# Patient Record
Sex: FEMALE | Race: WHITE | NOT HISPANIC OR LATINO | Employment: STUDENT | URBAN - METROPOLITAN AREA
[De-identification: names, ages, dates, MRNs, and addresses within clinical notes are randomized per-mention and may not be internally consistent; named-entity substitution may affect disease eponyms.]

---
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
## 2025-01-05 ENCOUNTER — OFFICE VISIT (OUTPATIENT)
Dept: URGENT CARE | Facility: CLINIC | Age: 20
End: 2025-01-05
Payer: COMMERCIAL

## 2025-01-05 VITALS
OXYGEN SATURATION: 99 % | TEMPERATURE: 98 F | BODY MASS INDEX: 38.67 KG/M2 | RESPIRATION RATE: 16 BRPM | WEIGHT: 218.25 LBS | HEIGHT: 63 IN | SYSTOLIC BLOOD PRESSURE: 118 MMHG | DIASTOLIC BLOOD PRESSURE: 80 MMHG | HEART RATE: 113 BPM

## 2025-01-05 DIAGNOSIS — R09.82 ALLERGIC RHINITIS WITH POSTNASAL DRIP: ICD-10-CM

## 2025-01-05 DIAGNOSIS — J02.0 STREP PHARYNGITIS: Primary | ICD-10-CM

## 2025-01-05 DIAGNOSIS — J30.9 ALLERGIC RHINITIS WITH POSTNASAL DRIP: ICD-10-CM

## 2025-01-05 DIAGNOSIS — J02.9 SORE THROAT: ICD-10-CM

## 2025-01-05 LAB
CTP QC/QA: YES
MOLECULAR STREP A: POSITIVE

## 2025-01-05 PROCEDURE — 99214 OFFICE O/P EST MOD 30 MIN: CPT | Mod: S$GLB,,, | Performed by: FAMILY MEDICINE

## 2025-01-05 PROCEDURE — 87651 STREP A DNA AMP PROBE: CPT | Mod: QW,S$GLB,, | Performed by: FAMILY MEDICINE

## 2025-01-05 RX ORDER — DEXAMETHASONE 4 MG/1
8 TABLET ORAL DAILY
Qty: 6 TABLET | Refills: 0 | Status: SHIPPED | OUTPATIENT
Start: 2025-01-05 | End: 2025-01-08

## 2025-01-05 RX ORDER — AMOXICILLIN AND CLAVULANATE POTASSIUM 875; 125 MG/1; MG/1
1 TABLET, FILM COATED ORAL EVERY 12 HOURS
Qty: 14 TABLET | Refills: 0 | Status: SHIPPED | OUTPATIENT
Start: 2025-01-05 | End: 2025-01-12

## 2025-01-05 RX ORDER — AMOXICILLIN AND CLAVULANATE POTASSIUM 875; 125 MG/1; MG/1
1 TABLET, FILM COATED ORAL EVERY 12 HOURS
Qty: 14 TABLET | Refills: 0 | Status: SHIPPED | OUTPATIENT
Start: 2025-01-05 | End: 2025-01-05

## 2025-01-05 RX ORDER — FLUTICASONE PROPIONATE 50 MCG
2 SPRAY, SUSPENSION (ML) NASAL DAILY
Qty: 9.9 ML | Refills: 3 | Status: SHIPPED | OUTPATIENT
Start: 2025-01-05

## 2025-01-05 RX ORDER — FLUTICASONE PROPIONATE 50 MCG
2 SPRAY, SUSPENSION (ML) NASAL DAILY
Qty: 9.9 ML | Refills: 3 | Status: SHIPPED | OUTPATIENT
Start: 2025-01-05 | End: 2025-01-05

## 2025-01-05 RX ORDER — DEXAMETHASONE 4 MG/1
8 TABLET ORAL DAILY
Qty: 6 TABLET | Refills: 0 | Status: SHIPPED | OUTPATIENT
Start: 2025-01-05 | End: 2025-01-05

## 2025-01-05 NOTE — PROGRESS NOTES
"Subjective:      Patient ID: Navjot Cool is a 19 y.o. female.    Vitals:  height is 5' 3" (1.6 m) and weight is 99 kg (218 lb 4.1 oz). Her oral temperature is 98.2 °F (36.8 °C). Her blood pressure is 118/80 and her pulse is 113 (abnormal). Her respiration is 16 and oxygen saturation is 99%.     Chief Complaint: Sore Throat    Pt present with sore throat, headaches, congestion. Sx started 1 week ago and sore throat started 2 days ago. Tx include dyquil with no relief.     Sore Throat   This is a new problem. The current episode started in the past 7 days. The problem has been gradually worsening. There has been no fever. The pain is at a severity of 5/10. The pain is moderate. Associated symptoms include congestion, coughing, headaches and trouble swallowing. Pertinent negatives include no vomiting. She has tried nothing for the symptoms.     HENT:  Positive for congestion, sore throat and trouble swallowing.    Respiratory:  Positive for cough.    Gastrointestinal:  Negative for vomiting.   Neurological:  Positive for headaches.      Objective:     Physical Exam   Constitutional: She is oriented to person, place, and time. She appears well-developed. She is cooperative.  Non-toxic appearance. She does not appear ill. No distress.   HENT:   Head: Normocephalic and atraumatic.   Ears:   Right Ear: Hearing, tympanic membrane, external ear and ear canal normal.   Left Ear: Hearing, tympanic membrane, external ear and ear canal normal.   Nose: Nose normal. No mucosal edema, rhinorrhea or nasal deformity. No epistaxis. Right sinus exhibits no maxillary sinus tenderness and no frontal sinus tenderness. Left sinus exhibits no maxillary sinus tenderness and no frontal sinus tenderness.   Mouth/Throat: Uvula is midline, oropharynx is clear and moist and mucous membranes are normal. No trismus in the jaw. Normal dentition. No uvula swelling. No oropharyngeal exudate, posterior oropharyngeal edema or posterior " oropharyngeal erythema.   Eyes: Conjunctivae and lids are normal. No scleral icterus.   Neck: Trachea normal and phonation normal. Neck supple. No edema present. No erythema present. No neck rigidity present.   Cardiovascular: Normal rate, regular rhythm, normal heart sounds and normal pulses.   Pulmonary/Chest: Effort normal and breath sounds normal. No respiratory distress. She has no decreased breath sounds. She has no rhonchi.   Abdominal: Normal appearance.   Musculoskeletal: Normal range of motion.         General: No deformity. Normal range of motion.   Neurological: She is alert and oriented to person, place, and time. She exhibits normal muscle tone. Coordination normal.   Skin: Skin is warm, dry, intact, not diaphoretic and not pale.   Psychiatric: Her speech is normal and behavior is normal. Judgment and thought content normal.   Nursing note and vitals reviewed.    Results for orders placed or performed in visit on 01/05/25   POCT Strep A, Molecular    Collection Time: 01/05/25  3:02 PM   Result Value Ref Range    Molecular Strep A, POC Positive (A) Negative     Acceptable Yes        Assessment:     1. Strep pharyngitis    2. Allergic rhinitis with postnasal drip    3. Sore throat        Plan:       Strep pharyngitis  -     Discontinue: amoxicillin-clavulanate 875-125mg (AUGMENTIN) 875-125 mg per tablet; Take 1 tablet by mouth every 12 (twelve) hours. for 7 days  Dispense: 14 tablet; Refill: 0  -     amoxicillin-clavulanate 875-125mg (AUGMENTIN) 875-125 mg per tablet; Take 1 tablet by mouth every 12 (twelve) hours. for 7 days  Dispense: 14 tablet; Refill: 0    Allergic rhinitis with postnasal drip  -     Discontinue: dexAMETHasone (DECADRON) 4 MG Tab; Take 2 tablets (8 mg total) by mouth once daily. for 3 doses  Dispense: 6 tablet; Refill: 0  -     dexAMETHasone (DECADRON) 4 MG Tab; Take 2 tablets (8 mg total) by mouth once daily. for 3 doses  Dispense: 6 tablet; Refill: 0    Sore  throat  -     POCT Strep A, Molecular    Other orders  -     Discontinue: fluticasone propionate (FLONASE) 50 mcg/actuation nasal spray; 2 sprays (100 mcg total) by Each Nostril route Daily.  Dispense: 9.9 mL; Refill: 3  -     fluticasone propionate (FLONASE) 50 mcg/actuation nasal spray; 2 sprays (100 mcg total) by Each Nostril route Daily.  Dispense: 9.9 mL; Refill: 3      Thank you for choosing [facility]!     Our goal in the Urgent Care is to always provide outstanding medical care. You may receive a survey by mail or e-mail in the next week regarding your experience today. We would greatly appreciate you completing and returning the survey. Your feedback provides us with a way to recognize our staff who provide very good care, and it helps us learn how to improve when your experience was below our aspiration of excellence.       We appreciate you trusting us with your medical care. We hope you feel better soon. We will be happy to take care of you for all of your future medical needs.  You must understand that you've received an Urgent Care treatment only and that you may be released before all your medical problems are known or treated. You, the patient, will arrange for follow up care as instructed.  Follow up with your PCP or specialty clinic as directed in the next 1-2 weeks if not improved or as needed.  You can call [phone] to schedule an appointment with the appropriate provider.  Another option is to follow up with [facility] ([email or web address]) virtually for quick simple medical advice.  If your condition worsens we recommend that you receive another evaluation at the emergency room immediately or contact your primary medical clinics after hours call service to discuss your concerns.  Please return here or go to the Emergency Department for any concerns or worsening of condition.      *If you were prescribed a narcotic or controlled  medication, do not drive or operate heavy equipment or machinery while taking these medications.

## 2025-01-20 ENCOUNTER — TELEPHONE (OUTPATIENT)
Dept: OBSTETRICS AND GYNECOLOGY | Facility: CLINIC | Age: 20
End: 2025-01-20
Payer: COMMERCIAL

## 2025-01-20 NOTE — TELEPHONE ENCOUNTER
Called patient. No answer. Left voice message for patient to call the office.      Clinic closure due to weather. Needs to be rescheduled.

## 2025-01-26 ENCOUNTER — PATIENT MESSAGE (OUTPATIENT)
Dept: PRIMARY CARE CLINIC | Facility: CLINIC | Age: 20
End: 2025-01-26
Payer: COMMERCIAL

## 2025-01-27 ENCOUNTER — PATIENT MESSAGE (OUTPATIENT)
Dept: PRIMARY CARE CLINIC | Facility: CLINIC | Age: 20
End: 2025-01-27
Payer: COMMERCIAL

## 2025-01-28 ENCOUNTER — OFFICE VISIT (OUTPATIENT)
Dept: PRIMARY CARE CLINIC | Facility: CLINIC | Age: 20
End: 2025-01-28
Payer: COMMERCIAL

## 2025-01-28 DIAGNOSIS — F41.8 SITUATIONAL ANXIETY: Primary | ICD-10-CM

## 2025-01-28 RX ORDER — SERTRALINE HYDROCHLORIDE 25 MG/1
25 TABLET, FILM COATED ORAL DAILY
Qty: 30 TABLET | Refills: 3 | Status: SHIPPED | OUTPATIENT
Start: 2025-01-28 | End: 2026-01-28

## 2025-01-28 NOTE — PROGRESS NOTES
Subjective:       Patient ID: Navjot Cool is a 19 y.o. female.    Chief Complaint: Anxiety (Pt has been experiencing increased anxiety since staring school and working/ she feels anxious/ anxiety increased over the last 6 months/ would like to discuss med options )    Anxiety  Patient reports no chest pain, confusion, dysphagia or palpitations.       18 y/o female with hx of exercise induced asthma is here to discuss anxiety.    She is noticing that she is more anxious lately and fixating on the future, she is frustrated with how she is playing softball, she is not doing as well as she thinks she should, she has trouble concentrating and focusing, some lack of motivation, she is sleeping fair, she has 3-4 good nights a week, she wakes up randomly, she is a little restless at night. She denies dysphoria or SI. She does 3 hours of softball in the morning and softball weights for 30 min in the afternoon, and her weekends she practices 9-12. She is feeling ok otherwise, she denies f/n/v/d/constipation/cp/sob/urinary sx. Her cycles are more regular now.      Asthma exercise induced: albuterol prn  GYN: never been  Eye exam due  Dental exam due    Review of Systems   Constitutional:  Negative for activity change and unexpected weight change.   HENT:  Negative for hearing loss, rhinorrhea and trouble swallowing.    Eyes:  Negative for discharge and visual disturbance.   Respiratory:  Negative for chest tightness and wheezing.    Cardiovascular:  Negative for chest pain and palpitations.   Gastrointestinal:  Negative for blood in stool, constipation, diarrhea and vomiting.   Endocrine: Negative for polydipsia and polyuria.   Genitourinary:  Negative for difficulty urinating, dysuria, hematuria and menstrual problem.   Musculoskeletal:  Negative for arthralgias, joint swelling and neck pain.   Neurological:  Negative for weakness and headaches.   Psychiatric/Behavioral:  Positive for dysphoric mood. Negative for  confusion.        Objective:      LMP 12/15/2024   Physical Exam  Constitutional:       General: She is not in acute distress.     Appearance: She is well-developed. She is not diaphoretic.   HENT:      Head: Normocephalic and atraumatic.   Pulmonary:      Effort: No respiratory distress.   Neurological:      Mental Status: She is alert and oriented to person, place, and time.         Assessment:       1. Situational anxiety        Plan:   Navjot was seen today for anxiety.    Diagnoses and all orders for this visit:    Situational anxiety  -     sertraline (ZOLOFT) 25 MG tablet; Take 1 tablet (25 mg total) by mouth once daily.      The patient location is: [place]  The chief complaint leading to consultation is: anxiety    Visit type: audiovisual    Face to Face time with patient: 25 minutes of total time spent on the encounter, which includes face to face time and non-face to face time preparing to see the patient (eg, review of tests), Obtaining and/or reviewing separately obtained history, Documenting clinical information in the electronic or other health record, Independently interpreting results (not separately reported) and communicating results to the patient/family/caregiver, or Care coordination (not separately reported).         Each patient to whom he or she provides medical services by telemedicine is:  (1) informed of the relationship between the physician and patient and the respective role of any other health care provider with respect to management of the patient; and (2) notified that he or she may decline to receive medical services by telemedicine and may withdraw from such care at any time.    Notes:

## 2025-02-12 ENCOUNTER — PATIENT MESSAGE (OUTPATIENT)
Dept: SPORTS MEDICINE | Facility: CLINIC | Age: 20
End: 2025-02-12
Payer: COMMERCIAL

## 2025-02-12 ENCOUNTER — ATHLETIC TRAINING SESSION (OUTPATIENT)
Dept: SPORTS MEDICINE | Facility: CLINIC | Age: 20
End: 2025-02-12

## 2025-02-12 DIAGNOSIS — M25.562 ACUTE PAIN OF LEFT KNEE: Primary | ICD-10-CM

## 2025-02-12 NOTE — PROGRESS NOTES
"Reason for Encounter New Injury    Subjective:       Chief Complaint: Navjot Cool is a 19 y.o. female student at  who had no chief complaint listed for this encounter.    Athlete was going through exercise " warm up " lateral shuffles before game until she fell on the ground and sts that she felt a " pop" in her left knee.     Handedness: right-handed  Sport played: softball      Level: college      Position:pitcher                        Objective:       General: Navjot is well-developed, well-nourished, appears stated age,moderate  acute distress, alert and oriented to time, place and person.               Right Knee Exam   Right knee exam is normal.    Left Knee Exam     Inspection   Erythema: absent  Scars: absent  Swelling: present  Effusion: present  Deformity: absent  Bruising: absent    Tenderness   The patient tender to palpation of the MCL, medial joint line and pes anserinus.    Range of Motion   Extension:  abnormal   Flexion:  abnormal     Tests   Stability   Lachman: abnormal   MCL - Valgus: abnormal  LCL - Varus: normal (0 to 2mm)  Patella   Patellar Tracking: abnormal    Comments:  Athlete was given ice and compress to help with pain but swelling was present. Some special test may be a false positiveLeft Hip Exam     Inspection   Quadriceps Atrophy:  negative                  Assessment:     Status: O - Out    Date Seen:  2/11/2025    Date of Injury:  2/11/2025    Date Out:  2/11/2025    Date Cleared:  n/a        Treatment/Rehab/Maintenance:   MAINTENANCE LOG  DATE OF SERVICE: 2/11/2025  INJURY/CONDITON: possibly injured MCL & ACL      MODALITIES:    MISCELLANEOUS:       []Active Release   []Compression Wrap   []Cupping    []Support Wrap  []E-Stim- Compex   []Taping  []E-Stim- IFC    []Foam Roller  []E-Stim- Premod   []Cold Tub  []Joint Mobilization   []Contrast Tub  []Manual Therapy   []Hot Pack  []Massage    []Hot Tub  []Massage - Scar Tissue  []Ice Cup  []Myofascial Release   [x]Ice " Pack  []Flossing/BFR   []GameReady  []Ultrasound  []Ultrasound - Phonophoresis  []Instrumental Assisted Soft Tissue Mobilization (IASTM)  []Intermittent Compression - Normatec  []Massage Gun  []ROM - Active  []ROM - Passive  []Stretching - Active  []Stretching - Dynamic  []Stretching - Passive  []Stretching - PNF  []Stretching - Static  []Mobility Work - Hip/Back  []Mobility Work - Ankle  []Mobility Work - Shoulder      OTHER/COMMENTS:     Plan:       1. Determing extent of injury and develop plan  2. Physician Referral: yes  3. ED Referral:no  4. Parent/Guardian Notified:   5. All questions were answered, ath. will contact me for questions or concerns in  the interim.  6.         Eligible to use School Insurance: Yes

## 2025-02-13 NOTE — PROGRESS NOTES
"CC: left knee pain    19 y.o. Female presents today for evaluation of her left knee pain. 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 She is here today with her mother who was present for the duration of the visit. She reports the onset of left knee pain, via non-contact, while performing side shuffles at softball practice on 2/11/25. She reports her left knee "went in" and it felt like "my knee tore." She reports she was unable to finish practice due to her left knee pain. She reports an increase in pain when walking and going up/down stairs. She reports a decrease in pain when taking ibuprofen. When asked where her pain is located she gestures to tthe superior and medial aspect of her knee. Of note, she reports a prior history of left knee cartilage repair, this past May 2024, by my partner Dr. Evan Metzger after sustaining an 80 mph softball to her knee.     How long: Patient admits to experiencing left knee pain since 2/11/25  What makes it better: Patient admits to decreased pain with ibuprofen  What makes it worse: Patient admits to increased pain with walking and going up and down stairs  Does it radiate: Patient admits to radiating pain  Attempted treatments: Patient admits to the following attempted treatments: ibuprofen  History of trauma/injury: Patient admits to history of trauma/injury (left knee arthroscopy 5/6/24)  Pain score: Patient admits to a pain score of 4/10 at rest and 8/10 at its worst  Any mechanical symptoms: Patient admits to mechanical symptoms (pop)  Feelings of instability: Patient admits to feelings of instability  Problems with ADLs: Patient denies her pain affecting her ability to perform her ADLs    PAST MEDICAL HISTORY:   Past Medical History:   Diagnosis Date    Arm fracture     Asthma     exercise induced per mother    Eczema      PAST SURGICAL HISTORY:   Past Surgical History:   Procedure Laterality Date    ADENOIDECTOMY      ARTHROSCOPIC CHONDROPLASTY " OF KNEE JOINT Left 05/06/2024    Procedure: ARTHROSCOPY, KNEE, WITH CHONDROPLASTY PARTIAL AND LATERAL;  Surgeon: Evan Metzger MD;  Location: [facility];  Service: Orthopedics;  Laterality: Left;  regional w/o catheter (adductor)    TONSILLECTOMY      TYMPANOSTOMY TUBE PLACEMENT       FAMILY HISTORY:   Family History   Problem Relation Name Age of Onset    Cancer Paternal Grandmother          not sure type    Cancer Paternal Grandfather          colon    Melanoma Neg Hx      Diabetes Neg Hx      Heart disease Neg Hx      Stroke Neg Hx       SOCIAL HISTORY:   Social History     Socioeconomic History    Marital status: Single   Tobacco Use    Smoking status: Never     Passive exposure: Yes    Smokeless tobacco: Never   Substance and Sexual Activity    Alcohol use: No    Drug use: Never    Sexual activity: Not Currently   Social History Narrative    ** Merged History Encounter **         Freshman at [email or web address], plays softball     Social Drivers of Health     Financial Resource Strain: Low Risk  (1/28/2025)    Overall Financial Resource Strain (CARDIA)     Difficulty of Paying Living Expenses: Not very hard   Food Insecurity: No Food Insecurity (1/28/2025)    Hunger Vital Sign     Worried About Running Out of Food in the Last Year: Never true     Ran Out of Food in the Last Year: Never true   Physical Activity: Sufficiently Active (1/28/2025)    Exercise Vital Sign     Days of Exercise per Week: 7 days     Minutes of Exercise per Session: 120 min   Stress: Stress Concern Present (1/28/2025)    [employer] - Occupational Stress Questionnaire     Feeling of Stress : Rather much   Housing Stability: Unknown (1/28/2025)    Housing Stability Vital Sign     Unable to Pay for Housing in the Last Year: No     MEDICATIONS:   Current Outpatient Medications:     fluticasone propionate (FLONASE) 50 mcg/actuation nasal spray, 2 sprays (100 mcg total) by Each Nostril route Daily., Disp: 9.9 mL, Rfl: 3    " sertraline (ZOLOFT) 25 MG tablet, Take 1 tablet (25 mg total) by mouth once daily., Disp: 30 tablet, Rfl: 3    ALLERGIES:   Review of patient's allergies indicates:  No Known Allergies     PHYSICAL EXAMINATION:  /74   Pulse 80   Ht 5' 2.99" (1.6 m)   Wt 99 kg (218 lb 4.1 oz)   BMI 38.67 kg/m²   Vitals signs and nursing note have been reviewed.  General: In no acute distress, well developed, well nourished, no diaphoresis  Eyes: EOM full and smooth, no eye redness or discharge  HENT: normocephalic and atraumatic, neck supple, trachea midline, no nasal discharge, no external ear redness or discharge  Lungs: respirations non-labored, no conversational dyspnea   Neuro: alert & oriented  Skin: No rashes, warm and dry  Psychiatric: cooperative, pleasant, mood and affect appropriate for age  Msk: see below    LEFT KNEE EXAMINATION   Affected side is compared to contralateral knee     Observation:  There is a mild to moderate suprapatellar effusion.   Normal gait without  antalgia.     Tenderness:  Patella - none    Lateral joint line - none  Quad tendon - none   Medial joint line - ? positive  Patellar tendon - none  Medial plica - none  Tibial tubercle - none   Lateral plica - none  Pes anserine - none   MCL prox - positive  Distal ITB - none   MCL distal - none  MFC - positive    LCL prox - none  LFC - none    LCL distal - none  Tibia - none    Fibula - none    No obvious bursae, plicae, popliteal cysts, or tendon derangement palpated.          ROM (* = with pain):   Active extension to 0° on left without hyperextension, lag, crepitus, or patellar J sign.   Active extension to 0° on right without hyperextension, lag, crepitus, or patellar J sign.   Active flexion to 135° on left (*) and 135° on right.    Strength (* = with pain):  Knee Flexion - 5/5 on left (*) and 5/5 on right  Knee Extension - 5/5 on left and 5/5 on right  Ankle Dorsiflexion - 5/5 on left and 5/5 on right  Ankle Plantarflexion - 5/5 on left " (*) and 5/5 on right    Patellofemoral Exam:  Patellar ballottement - positive  Bulge sign - positive  Patellar grind - negative  No patellar laxity with medial and lateral translation   No apprehension with medial and lateral patellar translation.     Meniscus Testing:     Juan Manuels test - positive   Thesaly test - positive  Bounce home test - negative  Decline squat test - positive    Ligament Testing:  Lachman's test - negative  No laxity with anterior drawer.  No laxity with posterior drawer.    No laxity with varus testing at 0 and 30 degrees.  Pain, w/o associated laxity with valgus testing at 0 and 30 degrees.    IMAGIN. X-ray ordered, 25, due to left knee pain  2. X-ray images were interpreted personally by me and then reviewed directly with patient.  3. My interpretation of imaging is no acute bony fracture or abnormality. No joint dislocation. No soft tissue swelling.    ASSESSMENT:      ICD-10-CM ICD-9-CM   1. Injury of left knee, initial encounter  S89.92XA 959.7   2. Mechanical knee pain, left  M25.562 719.46   3. 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 Today's exam is concerning for a medial meniscus tear, in conjunction with a MCL sprain. She will require an MRI of the left knee to definitively diagnose. Please see detailed plan below.     XRs ordered in the office today and images were personally interpreted and then reviewed with the patient. See above for further detail.    2.   MRI of the left knee ordered to assess the above concerning pathology.     3.   In the interim, while awaiting MRI results, will have patient start physical therapy.     4.   Patient fitted for and provided a hinged knee brace for  support/stability during ambulation.    5.   Follow-up once MRI results obtained or sooner if needed.     All questions were answered to the best of my ability and all concerns were addressed at this time.

## 2025-02-14 ENCOUNTER — OFFICE VISIT (OUTPATIENT)
Dept: SPORTS MEDICINE | Facility: CLINIC | Age: 20
End: 2025-02-14
Payer: COMMERCIAL

## 2025-02-14 ENCOUNTER — HOSPITAL ENCOUNTER (OUTPATIENT)
Dept: RADIOLOGY | Facility: HOSPITAL | Age: 20
Discharge: HOME | End: 2025-02-14
Attending: STUDENT IN AN ORGANIZED HEALTH CARE EDUCATION/TRAINING PROGRAM
Payer: COMMERCIAL

## 2025-02-14 ENCOUNTER — CLINICAL SUPPORT (OUTPATIENT)
Dept: REHABILITATION | Facility: HOSPITAL | Age: 20
End: 2025-02-14
Payer: COMMERCIAL

## 2025-02-14 VITALS
DIASTOLIC BLOOD PRESSURE: 74 MMHG | WEIGHT: 218.25 LBS | HEART RATE: 80 BPM | HEIGHT: 63 IN | SYSTOLIC BLOOD PRESSURE: 115 MMHG | BODY MASS INDEX: 38.67 KG/M2

## 2025-02-14 DIAGNOSIS — S89.92XA INJURY OF LEFT KNEE, INITIAL ENCOUNTER: Primary | ICD-10-CM

## 2025-02-14 DIAGNOSIS — M25.562 ACUTE PAIN OF LEFT KNEE: Primary | ICD-10-CM

## 2025-02-14 DIAGNOSIS — M25.562 MECHANICAL KNEE PAIN, LEFT: ICD-10-CM

## 2025-02-14 DIAGNOSIS — M25.562 MECHANICAL KNEE PAIN, LEFT: Primary | ICD-10-CM

## 2025-02-14 DIAGNOSIS — S83.412A SPRAIN OF MEDIAL COLLATERAL LIGAMENT OF LEFT KNEE, INITIAL ENCOUNTER: ICD-10-CM

## 2025-02-14 DIAGNOSIS — S89.92XA INJURY OF LEFT KNEE, INITIAL ENCOUNTER: ICD-10-CM

## 2025-02-14 PROCEDURE — 73562 X-RAY EXAM OF KNEE 3: CPT | Mod: TC,LT

## 2025-02-14 PROCEDURE — 97161 PT EVAL LOW COMPLEX 20 MIN: CPT

## 2025-02-14 PROCEDURE — 97530 THERAPEUTIC ACTIVITIES: CPT

## 2025-02-14 PROCEDURE — 73562 X-RAY EXAM OF KNEE 3: CPT | Mod: 26,LT,, | Performed by: RADIOLOGY

## 2025-02-14 PROCEDURE — 99999 PR PBB SHADOW E&M-EST. PATIENT-LVL III: CPT | Mod: PBBFAC,,, | Performed by: STUDENT IN AN ORGANIZED HEALTH CARE EDUCATION/TRAINING PROGRAM

## 2025-02-14 NOTE — PROGRESS NOTES
Outpatient Rehab    Physical Therapy Evaluation    Patient Name: Navjot Cool  MRN: [id number]  YOB: 2005  Today's Date: 2/14/2025    Therapy Diagnosis:   Encounter Diagnoses   Name Primary?    Mechanical knee pain, left     Acute pain of left knee Yes     Physician: Blanca Lowe DO    Physician Orders: Eval and Treat  Medical Diagnosis: Mechanical knee pain, left [M25.562]     Visit # / Visits Authorized:  1 / 1   Date of Evaluation:  2/14/2025   Insurance Authorization Period: 2/14/25 to 2/14/26  Plan of Care Certification:  2/14/2025 to 4/15/25      Time In: 1220   Time Out: 1315  Total Time: 55   Total Billable Time: 55 minutes    Intake Outcome Measure for FOTO Survey    Therapist reviewed FOTO scores for Navjot Cool on 2/14/2025.   FOTO report - see Media section or FOTO account episode details.     Intake Score:  %         Subjective   History of Present Illness  Navjot is a 19 y.o. female who reports to physical therapy with a chief concern of L knee pain. According to the patient's chart, Navjot has a past medical history of Arm fracture, Asthma, and Eczema. Navjot has a past surgical history that includes Tonsillectomy; Tympanostomy tube placement; Arthroscopic chondroplasty of knee joint (Left, 05/06/2024); and Adenoidectomy.    The patient reports a medical diagnosis of Mechanical knee pain, left (M25.562).            History of Present Condition/Illness: Feeling her left knee pop/give out when she was warming up on Tuesday for softball. She was having immediate pain along her medial knee but is feeling better. Still getting some constant pain in the knee and has not been doing any softball activities yet. Coming from doctor who gave her a brace and can progress with softball activities as she is able to tolerate. Denies any numbness or tingling. Is planning on holding out from softball today and tomorrow with her next games next weekend.     Activities of Daily  Living  Social history was obtained from Patient.          Patient Responsibilities: Driving    Previously independent with activities of daily living? Yes     Currently independent with activities of daily living? Yes          Previously independent with instrumental activities of daily living? Yes     Currently independent with instrumental activities of daily living? Yes              Pain     Patient reports a current pain level of 3/10. Pain at best is reported as 0/10. Pain at worst is reported as 8/10.   Location: L knee  Clinical Progression (since onset): Stable  Pain Qualities: Aching, Burning, Sharp  Pain-Relieving Factors: Ice, Medications - prescription  Pain-Aggravating Factors: Exercise, Sports, Stair climbing         Employment  Patient reports: Does the patient's condition impact their ability to work?  Employment Status: Student   Simple IT pitcher      Past Medical History/Physical Systems Review:   Navjot Cool  has a past medical history of Arm fracture, Asthma, and Eczema.    Navjot Cool  has a past surgical history that includes Tonsillectomy; Tympanostomy tube placement; Arthroscopic chondroplasty of knee joint (Left, 05/06/2024); and Adenoidectomy.    Navjot has a current medication list which includes the following prescription(s): fluticasone propionate and sertraline.    Review of patient's allergies indicates:  No Known Allergies     Objective   Bracing   The knee brace type is Hinged brace - unlocked.            Knee Observations     Pain with knee ext       Knee Swelling  Location of Measurement Right  (cm) Left  (cm)   20 cm Above Joint Line       10 cm Vastus Medialis Oblique       At Joint Line       15 cm Below Joint Line       Minimal along the joint and fat pad        Knee Palpation     TTP medial knee, along MCL                 Hip Range of Motion    Pain free all directions    Knee Range of Motion   Right Knee   Active (deg) Passive (deg) Pain   Flexion  "  120     Extension   2         Left Knee   Active (deg) Passive (deg) Pain   Flexion   115 Yes   Extension   2 Yes                      Hip Strength - Planes of Motion   Right Strength Right Pain Left Strength Left  Pain   Flexion (L2)           Extension 4-   3+     ABduction 4   4-     ADduction           Internal Rotation           External Rotation               Knee Strength   Right Strength Right Pain Left Strength Left  Pain   Flexion (S2)           Prone Flexion           Extension (L3)             Pain with SLR        Knee Special Tests  Knee Ligament Tests  Negative: Left Anterior Drawer, Left Lachman, and Left Posterior Drawer  Positive: Left Valgus Stress at 0 Degrees and Left Valgus Stress at 30 Degrees                  Knee Patellar Screening       normal        Transfers/Bed Mobility Details  Pain with squatting, shift to right             Treatment:                 Therapeutic Activity  Therapeutic Activity 1: BFR 60% (30,15,15,15) - quad sets, sidelying hip abd, staggered stance RDL  Therapeutic Activity 2: clams green 2x5x10"  Therapeutic Activity 3: pt education, HEP    Assessment & Plan   Assessment  Navjot presents with a condition of Low complexity.   Presentation of Symptoms: Stable       Functional Limitations: Ambulating on uneven surfaces, Activity tolerance, Pain with ADLs/IADLs, Driving, Completing work/school activities  Impairments: Abnormal coordination, Abnormal gait, Abnormal muscle firing, Abnormal muscle tone, Abnormal or restricted range of motion, Impaired physical strength    Patient Goal for Therapy (PT): get back to pain free ADL's and full softball activities  Prognosis: Good  Assessment Details: Navjot presenting with decreased knee range, decreased strength, abnormal gait, positive valgus/MCL testing, and pain with everyday activities. Has signs and symptoms consistent with MCL involvement. Discussion about symptoms and exercise progression with PT and over the weekend " with keeping symptoms minimal. She is appropriate for skilled PT interventions to address deficits and return to full PLOF.       Patient's spiritual, cultural, and educational needs considered and patient agreeable to plan of care and goals.     Education  Education was done with Patient.           HEP, quad strengthening. Keeping irritability low.        Goals:   Active       Physical Therapy       Physical Therapy Goal       Start:  02/14/25    Expected End:  04/15/25       GOALS: Short Term Goals:  2-3 weeks  1.Report decreased knee pain  < / =  0/10  to increase tolerance for ambulation  2. Increase knee ROM to full pain free in order to be able to perform ADLs without difficulty.  3. Increase strength by 1/3 MMT grade in quad  to increase tolerance for ADL and work activities.  4. Pt to tolerate HEP to improve ROM and independence with ADL's    Long Term Goals: 3-8 weeks  1.Report decreased knee pain < / = 0/10  to increase tolerance for softball  2.Patient goal: return to full softball activities  3.Increase strength to >/= 4+/5 in quad  to increase tolerance for ADL and work activities.  4. Pt will report at CJ level (20-40% impaired) on FOTO knee to demonstrate increase in LE function with every day tasks.                  Ash Barney, PT

## 2025-02-17 ENCOUNTER — HOSPITAL ENCOUNTER (OUTPATIENT)
Dept: RADIOLOGY | Facility: HOSPITAL | Age: 20
Discharge: HOME | End: 2025-02-17
Attending: STUDENT IN AN ORGANIZED HEALTH CARE EDUCATION/TRAINING PROGRAM
Payer: COMMERCIAL

## 2025-02-17 DIAGNOSIS — M25.562 MECHANICAL KNEE PAIN, LEFT: ICD-10-CM

## 2025-02-17 PROCEDURE — 73721 MRI JNT OF LWR EXTRE W/O DYE: CPT | Mod: TC,LT

## 2025-02-19 ENCOUNTER — OFFICE VISIT (OUTPATIENT)
Dept: SPORTS MEDICINE | Facility: CLINIC | Age: 20
End: 2025-02-19
Payer: COMMERCIAL

## 2025-02-19 VITALS
DIASTOLIC BLOOD PRESSURE: 77 MMHG | SYSTOLIC BLOOD PRESSURE: 118 MMHG | WEIGHT: 217.69 LBS | HEIGHT: 63 IN | BODY MASS INDEX: 38.57 KG/M2 | HEART RATE: 78 BPM

## 2025-02-19 DIAGNOSIS — S83.005D DISLOCATION OF LEFT PATELLA, SUBSEQUENT ENCOUNTER: ICD-10-CM

## 2025-02-19 DIAGNOSIS — S83.412D SPRAIN OF MEDIAL COLLATERAL LIGAMENT OF LEFT KNEE, SUBSEQUENT ENCOUNTER: Primary | ICD-10-CM

## 2025-02-19 DIAGNOSIS — F41.8 SITUATIONAL ANXIETY: ICD-10-CM

## 2025-02-19 RX ORDER — HYDROCODONE BITARTRATE AND ACETAMINOPHEN 5; 325 MG/1; MG/1
1 TABLET ORAL EVERY 6 HOURS PRN
COMMUNITY
Start: 2025-02-18

## 2025-02-19 RX ORDER — AMOXICILLIN 500 MG/1
500 CAPSULE ORAL 3 TIMES DAILY
COMMUNITY
Start: 2025-02-10

## 2025-02-19 RX ORDER — IBUPROFEN 800 MG/1
800 TABLET ORAL
COMMUNITY
Start: 2025-02-18

## 2025-02-19 NOTE — TELEPHONE ENCOUNTER
Refill Routing Note   Medication(s) are not appropriate for processing by [facility] for the following reason(s):        Med affordability    [facility] action(s):  Route      Medication Therapy Plan: Pharmacy comment: REQUEST FOR 90 DAYS PRESCRIPTION. DX Code Needed.      Appointments  past 12m or future 3m with PCP    Date Provider   Last Visit   1/28/2025 Rita Bell MD   Next Visit   2/25/2025 Rita Bell MD   ED visits in past 90 days: 0        Note composed:1:32 PM 02/19/2025

## 2025-02-19 NOTE — TELEPHONE ENCOUNTER
No care due was identified.  [facility] Embedded Care Due Messages. Reference number: [id number].   2/19/2025 11:48:53 AM CST

## 2025-02-19 NOTE — PROGRESS NOTES
"CC: left knee pain    Navjot is here today for a follow up evaluation of her left knee pain and to discuss the results of her left knee MRI obtained on 2/17/25. She is here today with her mother and younger sister who were present for the duration of the visit. She reports a pain score of 3/10 and 75% improvement since her last visit. She reports improvement in her left knee swelling since her last visit.     Recall from visit on 2/14/25  19 y.o. Female presents today for evaluation of her left knee pain. 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 She is here today with her mother who was present for the duration of the visit. She reports the onset of left knee pain, via non-contact, while performing side shuffles at softball practice on 2/11/25. She reports her left knee "went in" and it felt like "my knee tore." She reports she was unable to finish practice due to her left knee pain. She reports an increase in pain when walking and going up/down stairs. She reports a decrease in pain when taking ibuprofen. When asked where her pain is located she gestures to tthe superior and medial aspect of her knee. Of note, she reports a prior history of left knee cartilage repair, this past May 2024, by my partner Dr. Evan Metzger after sustaining an 80 mph softball to her knee.     How long: Patient admits to experiencing left knee pain since 2/11/25  What makes it better: Patient admits to decreased pain with ibuprofen  What makes it worse: Patient admits to increased pain with walking and going up and down stairs  Does it radiate: Patient admits to radiating pain  Attempted treatments: Patient admits to the following attempted treatments: ibuprofen  History of trauma/injury: Patient admits to history of trauma/injury (left knee arthroscopy 5/6/24)  Pain score: Patient admits to a pain score of 4/10 at rest and 8/10 at its worst  Any mechanical symptoms: Patient admits to mechanical symptoms " (pop)  Feelings of instability: Patient admits to feelings of instability  Problems with ADLs: Patient denies her pain affecting her ability to perform her ADLs    PAST MEDICAL HISTORY:   Past Medical History:   Diagnosis Date    Arm fracture     Asthma     exercise induced per mother    Eczema      PAST SURGICAL HISTORY:   Past Surgical History:   Procedure Laterality Date    ADENOIDECTOMY      ARTHROSCOPIC CHONDROPLASTY OF KNEE JOINT Left 05/06/2024    Procedure: ARTHROSCOPY, KNEE, WITH CHONDROPLASTY PARTIAL AND LATERAL;  Surgeon: Evan Metzger MD;  Location: [facility];  Service: Orthopedics;  Laterality: Left;  regional w/o catheter (adductor)    TONSILLECTOMY      TYMPANOSTOMY TUBE PLACEMENT       FAMILY HISTORY:   Family History   Problem Relation Name Age of Onset    Cancer Paternal Grandmother          not sure type    Cancer Paternal Grandfather          colon    Melanoma Neg Hx      Diabetes Neg Hx      Heart disease Neg Hx      Stroke Neg Hx       SOCIAL HISTORY:   Social History     Socioeconomic History    Marital status: Single   Tobacco Use    Smoking status: Never     Passive exposure: Yes    Smokeless tobacco: Never   Substance and Sexual Activity    Alcohol use: No    Drug use: Never    Sexual activity: Not Currently   Social History Narrative    ** Merged History Encounter **         Freshman at [employer], plays softball     Social Drivers of Health     Financial Resource Strain: Low Risk  (1/28/2025)    Overall Financial Resource Strain (CARDIA)     Difficulty of Paying Living Expenses: Not very hard   Food Insecurity: No Food Insecurity (1/28/2025)    Hunger Vital Sign     Worried About Running Out of Food in the Last Year: Never true     Ran Out of Food in the Last Year: Never true   Physical Activity: Sufficiently Active (1/28/2025)    Exercise Vital Sign     Days of Exercise per Week: 7 days     Minutes of Exercise per Session: 120 min   Stress: Stress Concern Present (1/28/2025)     "[employer] - Occupational Stress Questionnaire     Feeling of Stress : Rather much   Housing Stability: Unknown (1/28/2025)    Housing Stability Vital Sign     Unable to Pay for Housing in the Last Year: No     MEDICATIONS:   Current Outpatient Medications:     fluticasone propionate (FLONASE) 50 mcg/actuation nasal spray, 2 sprays (100 mcg total) by Each Nostril route Daily., Disp: 9.9 mL, Rfl: 3    sertraline (ZOLOFT) 25 MG tablet, Take 1 tablet (25 mg total) by mouth once daily., Disp: 30 tablet, Rfl: 3    ALLERGIES:   Review of patient's allergies indicates:  No Known Allergies     PHYSICAL EXAMINATION:  /77   Pulse 78   Ht 5' 3" (1.6 m)   Wt 98.8 kg (217 lb 11.3 oz)   BMI 38.56 kg/m²   Vitals signs and nursing note have been reviewed.  General: In no acute distress, well developed, well nourished, no diaphoresis  Eyes: EOM full and smooth, no eye redness or discharge  HENT: normocephalic and atraumatic, neck supple, trachea midline, no nasal discharge, no external ear redness or discharge  Lungs: respirations non-labored, no conversational dyspnea   Neuro: alert & oriented  Skin: No rashes, warm and dry  Psychiatric: cooperative, pleasant, mood and affect appropriate for age  Msk: see below    LEFT KNEE EXAMINATION   Affected side is compared to contralateral knee     Observation:  There is a mild suprapatellar effusion.   Normal gait without  antalgia.     Tenderness:  Patella - none    Lateral joint line - none  Quad tendon - none   Medial joint line - none  Patellar tendon - none  Medial plica - none  Tibial tubercle - none   Lateral plica - none  Pes anserine - none   MCL prox - positive  Distal ITB - none   MCL distal - none  MFC - positive    LCL prox - none  LFC - none    LCL distal - none  Tibia - none    Fibula - none  MPFL - negative    No obvious bursae, plicae, popliteal cysts, or tendon derangement palpated.          ROM (* = with pain):   Active extension to " 0° on left without hyperextension, lag, crepitus, or patellar J sign.   Active extension to 0° on right without hyperextension, lag, crepitus, or patellar J sign.   Active flexion to 135° on left (*) and 135° on right.    Strength (* = with pain):  Knee Flexion - 5/5 on left and 5/5 on right  Knee Extension - 5/5 on left and 5/5 on right  Ankle Dorsiflexion - 5/5 on left and 5/5 on right  Ankle Plantarflexion - 5/5 on left and 5/5 on right    Patellofemoral Exam:  Patellar ballottement - positive  Bulge sign - positive  No patellar laxity with medial and lateral translation   No apprehension with medial and lateral patellar translation.     Meniscus Testing:     Juan Manuels test - negative   Bounce home test - negative  Decline squat test - negative    Ligament Testing:  Lachman's test - negative  No laxity with anterior drawer.  No laxity with posterior drawer.    No laxity with varus testing at 0 and 30 degrees.  Resolution of previous pain, w/o associated laxity with valgus testing at 0 and 30 degrees.    IMAGIN. X-ray ordered, 25, due to left knee pain  2. X-ray images were interpreted personally by me and then reviewed directly with patient.  3. My interpretation of imaging is no acute bony fracture or abnormality. No joint dislocation. No soft tissue swelling.    1. MRI obtained 25 due to left knee pain  2. MRI images were reviewed personally by me and then directly with patient.  3. FINDINGS: Menisci:  There is no tear of the medial or lateral meniscus. Ligaments:  ACL, PCL, MCL, and LCL complex are intact.  There is a tear of the medial patellofemoral ligament at its femoral end attachment Tendons:  Extensor mechanism is maintained. Cartilage: Patellofemoral: Subtle contusion at the inferior medial patella and lateral aspect of the lateral femoral condyle suggestive of a prior patellar lateral dislocation/relocation type injury.  There is severe chondromalacia of the inferior patella, the  trochlear cartilage is intact. Medial tibiofemoral: Articular cartilage is maintained. Lateral tibiofemoral: Articular cartilage is maintained. Bone: Osseous contusion at the medial inferior patella and lateral aspect of the lateral femoral condyle, no fracture lines identified. Miscellaneous: There is a small joint effusion.  Minimal strain at the distal VMO  4. IMPRESSION: Lateral dislocation-relocation type patellar injury with a small area of osseous contusion at the medial patella and lateral aspect of the lateral femoral condyle.  Associated medial patellofemoral ligament high-grade tear at its femoral end is attachment and small joint effusion. Focal area of high-grade chondromalacia at the inferior patella.    Comments: I have personally reviewed and interpreted the imaging and I agree with the above radiology report.    ASSESSMENT:      ICD-10-CM ICD-9-CM   1. Sprain of medial collateral ligament of left knee, subsequent encounter  S83.412D V58.89     844.1   2. 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 MRI revealed osseous contusions and a high grade partial tear of the MPFL (consistent with a patellar dislocation). It additionally showed a focal area of high grade chondromalacia at the inferior pole of the patella. Today's exam is consistent with a recent MCL sprain. It is unclear if her patellar dislocation findings on MRI are acute or chronic, either way, will treat conservatively. Please see detailed plan below.     MRI of the left knee was recently obtained and images were personally interpreted and then reviewed with the patient. See above for further detail.    2.   Discussed treatment  options, patient can return to softball activity, as tolerated, with her supportive hinged knee brace. Once she has improved stability in her knee, she can discontinue the brace. This will be achieved with the help of physical therapy.    3.   Patient now in physical therapy, which will help with correction/strengthening of her muscular imbalances associated with her MCL sprain and patellar dislocation.     4.   Follow-up in 1-2 weeks in the training room or sooner if needed.     All questions were answered to the best of my ability and all concerns were addressed at this time.   Otezla Counseling: The side effects of Otezla were discussed with the patient, including but not limited to worsening or new depression, weight loss, diarrhea, nausea, upper respiratory tract infection, and headache. Patient instructed to call the office should any adverse effect occur.  The patient verbalized understanding of the proper use and possible adverse effects of Otezla.  All the patient's questions and concerns were addressed.

## 2025-02-20 RX ORDER — SERTRALINE HYDROCHLORIDE 25 MG/1
25 TABLET, FILM COATED ORAL
Qty: 90 TABLET | Refills: 2 | Status: SHIPPED | OUTPATIENT
Start: 2025-02-20

## 2025-02-26 ENCOUNTER — CLINICAL SUPPORT (OUTPATIENT)
Dept: REHABILITATION | Facility: HOSPITAL | Age: 20
End: 2025-02-26
Payer: COMMERCIAL

## 2025-02-26 DIAGNOSIS — M25.562 ACUTE PAIN OF LEFT KNEE: Primary | ICD-10-CM

## 2025-02-26 DIAGNOSIS — R29.898 DECREASED STRENGTH OF LOWER EXTREMITY: ICD-10-CM

## 2025-02-26 PROCEDURE — 97530 THERAPEUTIC ACTIVITIES: CPT

## 2025-02-26 NOTE — PROGRESS NOTES
Outpatient Rehab    Physical Therapy Visit    Patient Name: Navjot Cool  MRN: [id number]  YOB: 2005  Encounter Date: 2/26/2025    Therapy Diagnosis:   Encounter Diagnoses   Name Primary?    Acute pain of left knee Yes    Decreased strength of lower extremity      Physician: Blanca Lowe DO    Physician Orders: Eval and Treat  Medical Diagnosis: Mechanical knee pain, left [M25.562]     Visit # / Visits Authorized:  1 / 20   Date of Evaluation:  2/14/2025  Insurance Authorization Period: 2/13/2025 to 12/31/2025  Plan of Care Certification:  2/14/2025 to 4/15/2025      Time In: 1000   Time Out: 1055  Total Time: 55   Total Billable Time: 55    FOTO:  Intake Score:  %  Survey Score 1:  %  Survey Score 2:  %         Subjective   Her knee is feeling a little better, still difficulty mainly with stairs. She has not done musch of anything softball related but has her brace and is supposed to play in a game Friday..  Pain reported as 3/10.      Objective            Treatment:                 Therapeutic Activity  Therapeutic Activity 1: BFR 60-80% occlusion OKC and CKC respectively with rep scheme 30/15/15/15: quad sets into SLR, Stagggered RDL, runners clams Green  Therapeutic Activity 2: Step downs 4-inch 1 x 8, 5-inch 2 x 8 reps mirror for visual feedback  Therapeutic Activity 3: 2-up-1-down leg press 100# 4 x 5 reps  Therapeutic Activity 4: Lateral band walks BlueTB 3 laps on turf  Therapeutic Activity 5: LAQ hammer 15# 4 x 8 reps  Therapeutic Activity 6: Pt education on progress, exercises to avoid and progression into return to sport    Assessment & Plan   Assessment: Navjot presents with improvement in knee pain with subjective reports of continued difficulty with stair negotation and has not returned to softball activities yet. Session focused on improving lower extremity strength with increased focus on eccentric quad control and progression with hip strengthening to help offload her knee.  She demonstrates difficulty with control with single limb stability work and further warranted in follow-up sessions. Educated on progressions to return to sport and continued strengthening to improve discomfort. Continue to monitor and progress as able.       Patient will continue to benefit from skilled outpatient physical therapy to address the deficits listed in the problem list box on initial evaluation, provide pt/family education and to maximize pt's level of independence in the home and community environment.     Patient's spiritual, cultural, and educational needs considered and patient agreeable to plan of care and goals.           Plan:  Continue with current PT plan of care and progress as tolerated.     Goals:   Active       Physical Therapy       Physical Therapy Goal       Start:  02/14/25    Expected End:  04/15/25       GOALS: Short Term Goals:  2-3 weeks  1.Report decreased knee pain  < / =  0/10  to increase tolerance for ambulation  2. Increase knee ROM to full pain free in order to be able to perform ADLs without difficulty.  3. Increase strength by 1/3 MMT grade in quad  to increase tolerance for ADL and work activities.  4. Pt to tolerate HEP to improve ROM and independence with ADL's    Long Term Goals: 3-8 weeks  1.Report decreased knee pain < / = 0/10  to increase tolerance for softball  2.Patient goal: return to full softball activities  3.Increase strength to >/= 4+/5 in quad  to increase tolerance for ADL and work activities.  4. Pt will report at CJ level (20-40% impaired) on FOTO knee to demonstrate increase in LE function with every day tasks.                  Sherri Cummings, PT, DPT, OCS

## 2025-03-05 ENCOUNTER — CLINICAL SUPPORT (OUTPATIENT)
Dept: REHABILITATION | Facility: HOSPITAL | Age: 20
End: 2025-03-05
Payer: COMMERCIAL

## 2025-03-05 DIAGNOSIS — M25.662 DECREASED RANGE OF MOTION OF LEFT KNEE: ICD-10-CM

## 2025-03-05 DIAGNOSIS — R29.898 DECREASED STRENGTH OF LOWER EXTREMITY: ICD-10-CM

## 2025-03-05 DIAGNOSIS — M25.562 ACUTE PAIN OF LEFT KNEE: Primary | ICD-10-CM

## 2025-03-05 PROCEDURE — 97530 THERAPEUTIC ACTIVITIES: CPT

## 2025-03-05 NOTE — PROGRESS NOTES
"Outpatient Rehab    Physical Therapy Visit    Patient Name: Navjot Cool  MRN: [id number]  YOB: 2005  Encounter Date: 3/5/2025    Therapy Diagnosis:   Encounter Diagnoses   Name Primary?    Acute pain of left knee Yes    Decreased strength of lower extremity     Decreased range of motion of left knee      Physician: Blanca oLwe DO    Physician Orders: Eval and Treat  Medical Diagnosis: Mechanical knee pain, left [M25.562]     Visit # / Visits Authorized:  2 / 20   Date of Evaluation:  2/14/2025  Insurance Authorization Period: 2/13/2025 to 12/31/2025  Plan of Care Certification:  2/14/2025 to 4/15/2025      Time In: 1000   Time Out: 1056  Total Time: 56   Total Billable Time: 56       Subjective   She is doing better, was able to throw a bullpen with her brace without pain. She does still note discomfort with jogging and feels like it is going to give out on her at times..  Pain reported as 2/10.      Objective            Treatment:                 Therapeutic Activity  Therapeutic Activity 1: BFR 60-80% occlusion OKC and CKC respectively with rep scheme 30/15/15/15: LAQ hammer 7.5#, SL leg press 60#, Runner's clamshell Green  Therapeutic Activity 2: Curtsity step up 12-inch 3 x 8 reps  Therapeutic Activity 3: Step up on 8-inch with 10# CC pulling behind emphasis ecc 3 x 10 reps  Therapeutic Activity 4: RDL cone tap with Vaso strap 7# CC  Therapeutic Activity 6: Pt education on progress, exercises to avoid and progression into return to sport    Assessment & Plan   Assessment: Navjot presents with continued improvement in symptoms with only occasional feelings of "giving way" in her knee. Continued use of BFR to challenge lower extremity strengthening and progressing with loading. She was challenged with further single limb stability work as well with no adverse effects. She struggles with single limb stability balance but able to perform. Challenged with further hip strengthening as well " and educated on continued progression to full return to sport and time it takes to build strength. Continue to monitor and progress as able.       Patient will continue to benefit from skilled outpatient physical therapy to address the deficits listed in the problem list box on initial evaluation, provide pt/family education and to maximize pt's level of independence in the home and community environment.     Patient's spiritual, cultural, and educational needs considered and patient agreeable to plan of care and goals.           Plan:  Continue with current PT plan of care and progress as tolerated.     Goals:   Active       Physical Therapy       Physical Therapy Goal       Start:  02/14/25    Expected End:  04/15/25       GOALS: Short Term Goals:  2-3 weeks  1.Report decreased knee pain  < / =  0/10  to increase tolerance for ambulation  2. Increase knee ROM to full pain free in order to be able to perform ADLs without difficulty.  3. Increase strength by 1/3 MMT grade in quad  to increase tolerance for ADL and work activities.  4. Pt to tolerate HEP to improve ROM and independence with ADL's    Long Term Goals: 3-8 weeks  1.Report decreased knee pain < / = 0/10  to increase tolerance for softball  2.Patient goal: return to full softball activities  3.Increase strength to >/= 4+/5 in quad  to increase tolerance for ADL and work activities.  4. Pt will report at CJ level (20-40% impaired) on FOTO knee to demonstrate increase in LE function with every day tasks.                  Sherri Cummings, PT, DPT, OCS

## 2025-03-07 ENCOUNTER — PATIENT MESSAGE (OUTPATIENT)
Dept: SPORTS MEDICINE | Facility: CLINIC | Age: 20
End: 2025-03-07
Payer: COMMERCIAL

## 2025-03-11 ENCOUNTER — CLINICAL SUPPORT (OUTPATIENT)
Dept: REHABILITATION | Facility: HOSPITAL | Age: 20
End: 2025-03-11
Payer: COMMERCIAL

## 2025-03-11 DIAGNOSIS — M25.562 ACUTE PAIN OF LEFT KNEE: Primary | ICD-10-CM

## 2025-03-11 DIAGNOSIS — R29.898 DECREASED STRENGTH OF LOWER EXTREMITY: ICD-10-CM

## 2025-03-11 PROCEDURE — 97530 THERAPEUTIC ACTIVITIES: CPT

## 2025-03-11 NOTE — PROGRESS NOTES
Outpatient Rehab    Physical Therapy Visit    Patient Name: Navjot Cool  MRN: [id number]  YOB: 2005  Encounter Date: 3/11/2025    Therapy Diagnosis:   Encounter Diagnoses   Name Primary?    Acute pain of left knee Yes    Decreased strength of lower extremity      Physician: Blanca Lowe DO    Physician Orders: Eval and Treat  Medical Diagnosis: Mechanical knee pain, left [M25.562]     Visit # / Visits Authorized:  2 / 20   Date of Evaluation:  2/14/2025  Insurance Authorization Period: 2/13/2025 to 12/31/2025  Plan of Care Certification:  2/14/2025 to 4/15/2025      Time In: 1009   Time Out: 1100  Total Time: 51   Total Billable Time: 51       Subjective   She is doing pretty good, no pain currently. Is supposed to travel today and play in a game and will be using her brace..  Pain reported as 0/10.      Objective            Treatment:                 Therapeutic Activity  Therapeutic Activity 1: BFR 60-80% occlusion OKC and CKC respectively with rep scheme 30/15/15/15: LAQ hammer 7.5#, Staggered RDL 15#, Runner's clamshell Green  Therapeutic Activity 2: Lateral band walks with BlueTB 3 laps on turf  Therapeutic Activity 3: Vaso strap ancore lunge 10# glute emphasis 3 x 10 reps    Assessment & Plan   Assessment: Navjot continues with improvement in symptoms. COntinued use of BFR to progress with lower extremity strengthening with good tolerance. COnitnued work on single limb stability as well as further hip strengthening to further support her knee with good challenge. Educated on continued progression with loading with emphasis on quad and hip strenghtening and progressing with single limb stability. Will continue to monitor and progress as able.       Patient will continue to benefit from skilled outpatient physical therapy to address the deficits listed in the problem list box on initial evaluation, provide pt/family education and to maximize pt's level of independence in the home and  community environment.     Patient's spiritual, cultural, and educational needs considered and patient agreeable to plan of care and goals.           Plan:  Continue with current PT plan of care and progress as tolerated.     Goals:   Active       Physical Therapy       Physical Therapy Goal       Start:  02/14/25    Expected End:  04/15/25       GOALS: Short Term Goals:  2-3 weeks  1.Report decreased knee pain  < / =  0/10  to increase tolerance for ambulation  2. Increase knee ROM to full pain free in order to be able to perform ADLs without difficulty.  3. Increase strength by 1/3 MMT grade in quad  to increase tolerance for ADL and work activities.  4. Pt to tolerate HEP to improve ROM and independence with ADL's    Long Term Goals: 3-8 weeks  1.Report decreased knee pain < / = 0/10  to increase tolerance for softball  2.Patient goal: return to full softball activities  3.Increase strength to >/= 4+/5 in quad  to increase tolerance for ADL and work activities.  4. Pt will report at CJ level (20-40% impaired) on FOTO knee to demonstrate increase in LE function with every day tasks.                  Sherri Cummings, PT, DPT, OCS

## 2025-03-14 ENCOUNTER — PATIENT MESSAGE (OUTPATIENT)
Dept: PRIMARY CARE CLINIC | Facility: CLINIC | Age: 20
End: 2025-03-14
Payer: COMMERCIAL

## 2025-03-17 ENCOUNTER — OFFICE VISIT (OUTPATIENT)
Dept: PRIMARY CARE CLINIC | Facility: CLINIC | Age: 20
End: 2025-03-17
Payer: COMMERCIAL

## 2025-03-17 DIAGNOSIS — F41.1 GAD (GENERALIZED ANXIETY DISORDER): Primary | ICD-10-CM

## 2025-03-17 PROCEDURE — 1159F MED LIST DOCD IN RCRD: CPT | Mod: CPTII,95,, | Performed by: FAMILY MEDICINE

## 2025-03-17 PROCEDURE — 98005 SYNCH AUDIO-VIDEO EST LOW 20: CPT | Mod: 95,,, | Performed by: FAMILY MEDICINE

## 2025-03-17 PROCEDURE — 1160F RVW MEDS BY RX/DR IN RCRD: CPT | Mod: CPTII,95,, | Performed by: FAMILY MEDICINE

## 2025-03-17 NOTE — PROGRESS NOTES
Subjective:       Patient ID: Navjot Cool is a 19 y.o. female.    Chief Complaint: Follow-up (Pt here for 4 week f/u) and Anxiety (Zoloft f/u/ pt currently not taking due to side effects)    Follow-up  Pertinent negatives include no arthralgias, chest pain, headaches, joint swelling, neck pain, vomiting or weakness.   Anxiety  Patient reports no chest pain, confusion, dysphagia or palpitations.         20 y/o female with hx of exercise induced asthma is here to follow up SOY.    She tried Zoloft x 2 weeks and did not notice that it was helping so she stopped, she denies any negative side effects, when she stopped she notes her mood was worse and she is in a better place overall now, she is sleeping ok, she did discuss her anxiety with softball coaches and they were supportive, mentioned a sports psychiatrist for her.     Since last visit she dislocated her knee and tore a ligament she was out x 3 weeks but has been cleared to go back, she is doing PT 1 day a week, she has been back at practice fully now x 3 weeks      SOY  Asthma exercise induced: albuterol prn  GYN: never been  Eye exam due  Dental exam due      Review of Systems   Constitutional:  Negative for activity change and unexpected weight change.   HENT:  Negative for hearing loss, rhinorrhea and trouble swallowing.    Eyes:  Negative for discharge and visual disturbance.   Respiratory:  Negative for chest tightness and wheezing.    Cardiovascular:  Negative for chest pain and palpitations.   Gastrointestinal:  Negative for blood in stool, constipation, diarrhea and vomiting.   Endocrine: Negative for polydipsia and polyuria.   Genitourinary:  Negative for difficulty urinating, dysuria, hematuria and menstrual problem.   Musculoskeletal:  Negative for arthralgias, joint swelling and neck pain.   Neurological:  Negative for weakness and headaches.   Psychiatric/Behavioral:  Negative for confusion and dysphoric mood.      see HPI  Objective:       LMP 03/11/2025 (Exact Date)   Physical Exam  Constitutional:       General: She is not in acute distress.     Appearance: She is well-developed. She is not diaphoretic.   HENT:      Head: Normocephalic and atraumatic.   Pulmonary:      Effort: No respiratory distress.   Neurological:      Mental Status: She is alert and oriented to person, place, and time.         Assessment:       1. SOY (generalized anxiety disorder)        Plan:   Navjot was seen today for follow-up and anxiety.    Diagnoses and all orders for this visit:    SOY (generalized anxiety disorder)      The patient location is: [place]  The chief complaint leading to consultation is: soy    Visit type: audiovisual    Face to Face time with patient: 15 minutes of total time spent on the encounter, which includes face to face time and non-face to face time preparing to see the patient (eg, review of tests), Obtaining and/or reviewing separately obtained history, Documenting clinical information in the electronic or other health record, Independently interpreting results (not separately reported) and communicating results to the patient/family/caregiver, or Care coordination (not separately reported).         Each patient to whom he or she provides medical services by telemedicine is:  (1) informed of the relationship between the physician and patient and the respective role of any other health care provider with respect to management of the patient; and (2) notified that he or she may decline to receive medical services by telemedicine and may withdraw from such care at any time.    Notes:

## 2025-03-18 ENCOUNTER — CLINICAL SUPPORT (OUTPATIENT)
Dept: REHABILITATION | Facility: HOSPITAL | Age: 20
End: 2025-03-18
Payer: COMMERCIAL

## 2025-03-18 DIAGNOSIS — M25.562 ACUTE PAIN OF LEFT KNEE: Primary | ICD-10-CM

## 2025-03-18 PROCEDURE — 97530 THERAPEUTIC ACTIVITIES: CPT

## 2025-03-18 NOTE — PROGRESS NOTES
Outpatient Rehab    Physical Therapy Visit    Patient Name: Navjot Cool  MRN: [id number]  YOB: 2005  Encounter Date: 3/18/2025    Therapy Diagnosis:   Encounter Diagnosis   Name Primary?    Acute pain of left knee Yes     Physician: Blanca Lowe DO    Physician Orders: Eval and Treat  Medical Diagnosis: Mechanical knee pain, left [M25.562]     Visit # / Visits Authorized:  4 / 20   Date of Evaluation:  2/14/2025  Insurance Authorization Period: 2/13/2025 to 12/31/2025  Plan of Care Certification:  2/14/2025 to 4/15/2025      Time In: 1000   Time Out: 1056  Total Time: 56   Total Billable Time: 56       Subjective   No pain in her knee was able to throw a little in a game. Still not quite as strong but getting better..  Pain reported as 0/10.      Objective          Range of Motion:  Knee   Left 3-0-120 degrees     Tendeq Assessment   Knee Extension Isometric at 60 deg    Right  Left    Trial 1 48.5 kg  49.7 kg    Trial 2 52.6 kg  56.2 kg    Trial 3 53.0 kg  55.5 kg    Average  51.4 kg  53.8 kg      4.5% stronger     Knee Flexion Isometric at 60 deg    Right  Left    Trial 1 40.4 kg  40.1 kg    Trial 2 39.9 kg  35.0 kg    Trial 3 40.5 kg  39.1 kg   Average  40.3 kg  38.1 kg      5.5% weaker      Y-Balance Anterior Reach:   Right  Left    Trial 1 49 cm  45 cm    Trial 2 50 cm  49 cm    Trial 3  53 cm  47 cm    Average  50.7 cm  47 cm    *Composite difference of 4 cm (R>L)    Lower Extremity Strength (MMT):  - Hip Abduction Strength (PGM): Right = 4/5, Left = 4-/5       Treatment:                 Therapeutic Activity  TA 1: Tendeq and Y-Balance Assessment  TA 2: Sidelying clamshells BlueTB 3 x 12-15 reps  TA 3: RDL CC 13# 3 x 8 reps  TA 4: Step downs 6-inch anterior/lateral 3 x 8 reps  TA 5: Donkey kicks 25# 2 x 12 reps each  TA 6: Anti-rotation lunge 10# 2 x 10 reps each    Assessment & Plan   Assessment: Navjot continues with improvement in symptoms and further re-assessed for strength  deficits today with significant improvements. No reports of pain throughout testing. She continues with notable laxity with valgus stress testing on left compared to right but no pain. Struggled with eccentric quad control and continues with hip weakness and further strengthening warranted. Overall progressing well and ocntinued focus on progressing with single limb stability and quad/hip strengthening for full safe return to softball and recreational activities.       Patient will continue to benefit from skilled outpatient physical therapy to address the deficits listed in the problem list box on initial evaluation, provide pt/family education and to maximize pt's level of independence in the home and community environment.     Patient's spiritual, cultural, and educational needs considered and patient agreeable to plan of care and goals.           Plan:  Continue with current PT plan of care and progress as tolerated.     Goals:   Active       Physical Therapy       Physical Therapy Goal (Progressing)       Start:  02/14/25    Expected End:  04/15/25       GOALS: Short Term Goals:  2-3 weeks  1.Report decreased knee pain  < / =  0/10  to increase tolerance for ambulation  2. Increase knee ROM to full pain free in order to be able to perform ADLs without difficulty.  3. Increase strength by 1/3 MMT grade in quad  to increase tolerance for ADL and work activities.  4. Pt to tolerate HEP to improve ROM and independence with ADL's    Long Term Goals: 3-8 weeks  1.Report decreased knee pain < / = 0/10  to increase tolerance for softball  2.Patient goal: return to full softball activities  3.Increase strength to >/= 4+/5 in quad  to increase tolerance for ADL and work activities.  4. Pt will report at CJ level (20-40% impaired) on FOTO knee to demonstrate increase in LE function with every day tasks.                  Sherri Cummings, PT, DPT, OCS

## 2025-03-27 ENCOUNTER — CLINICAL SUPPORT (OUTPATIENT)
Dept: REHABILITATION | Facility: HOSPITAL | Age: 20
End: 2025-03-27
Payer: COMMERCIAL

## 2025-03-27 DIAGNOSIS — M25.562 ACUTE PAIN OF LEFT KNEE: Primary | ICD-10-CM

## 2025-03-27 DIAGNOSIS — R29.898 DECREASED STRENGTH OF LOWER EXTREMITY: ICD-10-CM

## 2025-03-27 PROCEDURE — 97530 THERAPEUTIC ACTIVITIES: CPT

## 2025-03-27 NOTE — PROGRESS NOTES
Outpatient Rehab    Physical Therapy Visit    Patient Name: Navjot Cool  MRN: [id number]  YOB: 2005  Encounter Date: 3/27/2025    Therapy Diagnosis:   Encounter Diagnoses   Name Primary?    Acute pain of left knee Yes    Decreased strength of lower extremity      Physician: Blanca Lowe DO    Physician Orders: Eval and Treat  Medical Diagnosis: Mechanical knee pain, left [M25.562]     Visit # / Visits Authorized:  5 / 20   Date of Evaluation:  2/14/2025  Insurance Authorization Period: 2/13/2025 to 12/31/2025  Plan of Care Certification:  2/14/2025 to 4/15/2025      Time In: 1300   Time Out: 1358  Total Time: 58   Total Billable Time: 58       Subjective   She landed wrong and had some discomfort with that shot from medial knee down into her foot but it was only one time but overall doing better..  Pain reported as 0/10.      Objective          Range of Motion:  Knee   Left 3-0-120 degrees     Tendeq Assessment   Knee Extension Isometric at 60 deg    Right  Left    Trial 1 48.5 kg  49.7 kg    Trial 2 52.6 kg  56.2 kg    Trial 3 53.0 kg  55.5 kg    Average  51.4 kg  53.8 kg      4.5% stronger     Knee Flexion Isometric at 60 deg    Right  Left    Trial 1 40.4 kg  40.1 kg    Trial 2 39.9 kg  35.0 kg    Trial 3 40.5 kg  39.1 kg   Average  40.3 kg  38.1 kg      5.5% weaker      Y-Balance Anterior Reach:   Right  Left    Trial 1 49 cm  45 cm    Trial 2 50 cm  49 cm    Trial 3  53 cm  47 cm    Average  50.7 cm  47 cm    *Composite difference of 4 cm (R>L)    Lower Extremity Strength (MMT):  - Hip Abduction Strength (PGM): Right = 4/5, Left = 4-/5       Treatment:                 Therapeutic Activity  TA 1: Runners clamshells GreenTB 3 x 12 reps  TA 2: Lateral band walks BlueTB 3 laps on turf  TA 3: Upright bike x 5' for functional mobility and cardiovascular endurance  TA 4: Anti-rotation (ancore) 10# lunge 3 x 8 reps  TA 5: 2 up 1 down SL press 4 x 6 reps 100-120# emphasis eccentric control  TA  6: 12# bound into throw at wall 3 x 8 reps  TA 7: 12# medball catches different angles into SL squat to 22-inch 2 x 10    Assessment & Plan   Assessment: Navjot continues with improvements with only one instance of discomfort since last session. She continues to be challenged progressing lower extremity strength with increased focus on hip/quad strengthening. Added further single limb stability work and loading with reaction with good challenge. Educated on continued focus on improving single limb stability and hip strength to offload medial knee. Will continue to monitor and progress as able.       Patient will continue to benefit from skilled outpatient physical therapy to address the deficits listed in the problem list box on initial evaluation, provide pt/family education and to maximize pt's level of independence in the home and community environment.     Patient's spiritual, cultural, and educational needs considered and patient agreeable to plan of care and goals.           Plan:  Continue with current PT plan of care and progress as tolerated.     Goals:   Active       Physical Therapy       Physical Therapy Goal (Progressing)       Start:  02/14/25    Expected End:  04/15/25       GOALS: Short Term Goals:  2-3 weeks  1.Report decreased knee pain  < / =  0/10  to increase tolerance for ambulation  2. Increase knee ROM to full pain free in order to be able to perform ADLs without difficulty.  3. Increase strength by 1/3 MMT grade in quad  to increase tolerance for ADL and work activities.  4. Pt to tolerate HEP to improve ROM and independence with ADL's    Long Term Goals: 3-8 weeks  1.Report decreased knee pain < / = 0/10  to increase tolerance for softball  2.Patient goal: return to full softball activities  3.Increase strength to >/= 4+/5 in quad  to increase tolerance for ADL and work activities.  4. Pt will report at CJ level (20-40% impaired) on FOTO knee to demonstrate increase in LE function with  every day tasks.                  Sherri Cummings, PT, DPT, OCS

## 2025-03-31 ENCOUNTER — ATHLETIC TRAINING SESSION (OUTPATIENT)
Dept: SPORTS MEDICINE | Facility: CLINIC | Age: 20
End: 2025-03-31
Payer: COMMERCIAL

## 2025-03-31 DIAGNOSIS — Z00.00 HEALTHCARE MAINTENANCE: Primary | ICD-10-CM

## 2025-03-31 NOTE — PROGRESS NOTES
"Reason for Encounter N/A    Subjective:       Chief Complaint: Navjot Cool is a 19 y.o. female student at [employer] ([employer]) who had concerns including Health Maintenance of the Right Shoulder.    Athlete sts that she has right rotator cuff tightness and sts that she felt a "knot" .    Handedness: right-handed  Sport played: softball      Level: college      Position:pitcher                      Objective:       General: Navjot is well-developed, well-nourished, appears stated age, in no acute distress, alert and oriented to time, place and person.             Assessment:     Status: F - Full Participation    Date Seen:  3/29/2025    Date of Injury:  3/29/2025    Date Out:  n/a    Date Cleared:  n/a        Treatment/Rehab/Maintenance:   MAINTENANCE LOG  DATE OF SERVICE: 3/30/2025 , 3/29/2025  INJURY/CONDITON: healthcare right rotator cuff maintenance      MODALITIES:    MISCELLANEOUS:       []Active Release   []Compression Wrap   []Cupping    []Support Wrap  []E-Stim- Compex   []Taping  []E-Stim- IFC    []Foam Roller  []E-Stim- Premod   []Cold Tub  []Joint Mobilization   []Contrast Tub  [x]Manual Therapy   []Hot Pack  []Massage    []Hot Tub  []Massage - Scar Tissue  []Ice Cup  []Myofascial Release   []Ice Pack  []Flossing/BFR   []GameReady  []Ultrasound  []Ultrasound - Phonophoresis  []Instrumental Assisted Soft Tissue Mobilization (IASTM)  []Intermittent Compression - Normatec  []Massage Gun  []ROM - Active  []ROM - Passive  []Stretching - Active  []Stretching - Dynamic  []Stretching - Passive  []Stretching - PNF  []Stretching - Static  []Mobility Work - Hip/Back  []Mobility Work - Ankle  []Mobility Work - Shoulder      OTHER/COMMENTS:       Plan:       1. Decrease shoulder stiffness  2. Physician Referral: no  3. ED Referral:no  4. Parent/Guardian Notified: No  5. All questions were answered, ath. will contact me for questions or concerns in  the interim.  6.         Eligible to use " School Insurance: Yes

## 2025-04-01 ENCOUNTER — ATHLETIC TRAINING SESSION (OUTPATIENT)
Dept: SPORTS MEDICINE | Facility: CLINIC | Age: 20
End: 2025-04-01
Payer: COMMERCIAL

## 2025-04-01 DIAGNOSIS — M25.531 ACUTE PAIN OF RIGHT WRIST: Primary | ICD-10-CM

## 2025-04-01 NOTE — PROGRESS NOTES
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. Of note, she is here today with her mother who was present for the duration of the visit. She reports this weekend was her first time back to pitching. She reports she had been completing bullpens for the past month, without any issues. She reports she started her stretch pitching in the 1st inning and by the 2nd or 3rd inning she started to experience swelling in her 5th digit. She reports she started to have numbness by the 6th inning, making it difficult to complete a screwball or  the ball. She had to be pulled from the game as a result. She reports the numbness is along her right 5th finger as well as the ulnar half of her right 4th finger. She reports she only experienced pain with finger flexion. She denies issues with ADLs.     PAST MEDICAL HISTORY:   Past Medical History:   Diagnosis Date    Arm fracture     Asthma     exercise induced per mother    Eczema      PAST SURGICAL HISTORY:   Past Surgical History:   Procedure Laterality Date    ADENOIDECTOMY      ARTHROSCOPIC CHONDROPLASTY OF KNEE JOINT Left 05/06/2024    Procedure: ARTHROSCOPY, KNEE, WITH CHONDROPLASTY PARTIAL AND LATERAL;  Surgeon: Evan Metzger MD;  Location: [facility];  Service: Orthopedics;  Laterality: Left;  regional w/o catheter (adductor)    TONSILLECTOMY      TYMPANOSTOMY TUBE PLACEMENT       FAMILY HISTORY:   Family History   Problem Relation Name Age of Onset    Cancer Paternal Grandmother          not sure type    Cancer Paternal Grandfather          colon    Melanoma Neg Hx      Diabetes Neg Hx      Heart disease Neg Hx      Stroke Neg Hx       SOCIAL HISTORY:   Social History[1]    MEDICATIONS:   Current  "Medications[2]    ALLERGIES:   Review of patient's allergies indicates:  No Known Allergies     PHYSICAL EXAMINATION:  /78   Pulse 76   Ht 5' 3" (1.6 m)   LMP 03/11/2025 (Exact Date)   BMI 38.56 kg/m²   Vitals signs and nursing note have been reviewed.  General: In no acute distress, well developed, well nourished, no diaphoresis  Eyes: EOM full and smooth, no eye redness or discharge  HENT: normocephalic and atraumatic, neck supple, trachea midline, no nasal discharge, no external ear redness or discharge  Cardiovascular: 2+ and symmetric radial pulses bilaterally, no LE edema  Lungs: respirations non-labored, no conversational dyspnea   Neuro: alert & oriented  Skin: No rashes, warm and dry  Psychiatric: cooperative, pleasant, mood and affect appropriate for age  Msk: see below    Shoulder: right  The affected shoulder is compared to the contralateral shoulder.    Observation:    No sternal, clavicular, or acromial deformities bilaterally.  No asymmetry of shoulders bilaterally.    ROM:  Active flexion to 180° bilaterally.   Active abduction to 180° bilaterally.    Active internal rotation to T7 bilaterally.    Active external rotation to T4 bilaterally.      Tenderness:  No tenderness at the AC joint  No tenderness over the clavicle   No tenderness over biceps tendon or bicipital groove  No tenderness over the lateral humerus  No tenderness within the supraspinatus fossa  No tenderness over subacromial space  No tenderness along the medial periscapular border     Strength Testing:  Deltoid - 5/5 bilaterally  Biceps - 5/5 bilaterally  Triceps - 5/5 bilaterally  Wrist extension - 5/5 bilaterally  Wrist flexion - 5/5 bilaterally   - 5/5 bilaterally    Special Tests:  Empty can test - negative  Full can test - negative    Resisted internal rotation - negative  Resisted external rotation - negative    Neer's test - negative  Hawkin's-Arnaldo test - negative (*although reproduces medial elbow " numbness*)    OSaads test - negative    Elbow: right   The affected elbow is compared to the contralateral elbow.    Observation:    There is no edema, erythema, or ecchymosis.    ROM:  Active flexion to 150° degrees bilaterally.    Active extension to 0° degrees bilaterally without hyperextension.   Active pronation to 80° degrees bilaterally.    Active supination to 80° degrees bilaterally.      Tenderness To Palpation:  No tenderness at the medial epicondyle or lateral epicondyle.  No tenderness at the olecranon.  No tenderness at the distal humerus or proximal radius/ulna.  No tenderness at the radial head.  No tenderness over the ulnar collateral ligaments.    Strength Testing:  See above    Special Tests:  No laxity of the MCL at 0 and 30 degrees.    No laxity of the LCL at 0 and 30 degrees.  Milking maneuver - negative  Moving valgus test - negative    Neurovascular Exam:  2+ radial pulses BL.  Tinel's sign (cubital tunnel) - negative    Hand: right   The affected Hand is compared to the contralateral Hand.    Observation:  No evidence of edema, erythema, ecchymosis, or effusion.    Tenderness:  Mild sensitivity to palpation of the 4th and 5th metacarpals.  No tenderness at the remaining metacarpals.     Range of Motion:  Active wrist extension to 70° on left and 70° on right.    Active wrist flexion to 80° on left and 80° on right.    Active radial deviation to 20° on left and 20° on right.    Active ulnar deviation to 30° on left and 30° on right.      Strength Testing:  Wrist extension - 5/5 on left and 5/5 on right  Wrist flexion - 5/5 on left and 5/5 on right  Ulnar deviation - 5/5 on left and 5/5 on right  Radial deviation - 5/5 on left and 5/5 on right   - 5/5 on left and 5/5 on right     IMAGIN. X-ray ordered, 25, due to right hand pain  2. X-ray images were interpreted personally by me and then reviewed directly with patient.  3. My interpretation of imaging is no acute bony fracture  or abnormality. No joint dislocation. No soft tissue swelling.    ASSESSMENT:      ICD-10-CM ICD-9-CM   1. Neuritis of right ulnar nerve  G56.21 723.4   2. Finger pain, right  M79.644 729.5   3. 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 Today's exam was relatively benign, however, her presentation most closely correlates with ulnar neuritis, during pitching due to valgus overload. She will benefit from correction of her throwing mechanica, via physical therapy. Please see detailed plan below.     XRs ordered in the office today and images were personally interpreted and then reviewed with the patient. See above for further detail.    2.   Referral placed to physical therapy to evaluate/treat as it relates to correction of throwing mechanics, to help limit valgus overload leading to ulnar neuritis during pitching.     3.   In the interim, she can continue to pitch, as tolerated. She should allow pain to be her guide.     4.   Follow-up in the training room or sooner if needed.    All questions were answered to the best of my ability and all concerns were addressed at this time.    I spent a total of 34 minutes on the day of the visit.  This includes face to face time and non-face to face time preparing to see the patient (eg, review of tests), obtaining and/or reviewing separately obtained history, documenting clinical information in the electronic or other health record, independently interpreting results and communicating results to the patient/family/caregiver, or care coordinator.       [1]   Social History  Socioeconomic History    Marital status: Single   Tobacco Use    Smoking status: Never     Passive exposure: Yes    Smokeless tobacco: Never   Substance and Sexual Activity    Alcohol use: No    Drug use:  Never    Sexual activity: Not Currently   Social History Narrative    ** Merged History Encounter **         Freshman at [employer], plays softball     Social Drivers of Health     Financial Resource Strain: Low Risk  (3/17/2025)    Overall Financial Resource Strain (CARDIA)     Difficulty of Paying Living Expenses: Not hard at all   Food Insecurity: No Food Insecurity (3/17/2025)    Hunger Vital Sign     Worried About Running Out of Food in the Last Year: Never true     Ran Out of Food in the Last Year: Never true   Transportation Needs: No Transportation Needs (3/17/2025)    PRAPARE - Transportation     Lack of Transportation (Medical): No     Lack of Transportation (Non-Medical): No   Physical Activity: Sufficiently Active (3/17/2025)    Exercise Vital Sign     Days of Exercise per Week: 7 days     Minutes of Exercise per Session: 120 min   Stress: Stress Concern Present (3/17/2025)    [employer] [place] [employer] - Occupational Stress Questionnaire     Feeling of Stress : To some extent   Housing Stability: Low Risk  (3/17/2025)    Housing Stability Vital Sign     Unable to Pay for Housing in the Last Year: No     Number of Times Moved in the Last Year: 1     Homeless in the Last Year: No   [2]   Current Outpatient Medications:     amoxicillin (AMOXIL) 500 MG capsule, Take 500 mg by mouth 3 (three) times daily. (Patient not taking: Reported on 3/17/2025), Disp: , Rfl:     fluticasone propionate (FLONASE) 50 mcg/actuation nasal spray, 2 sprays (100 mcg total) by Each Nostril route Daily., Disp: 9.9 mL, Rfl: 3    HYDROcodone-acetaminophen (NORCO) 5-325 mg per tablet, Take 1 tablet by mouth every 6 (six) hours as needed., Disp: , Rfl:     ibuprofen (ADVIL,MOTRIN) 800 MG tablet, Take 800 mg by mouth., Disp: , Rfl:     sertraline (ZOLOFT) 25 MG tablet, TAKE 1 TABLET BY MOUTH EVERY DAY (Patient not taking: Reported on 3/17/2025), Disp: 90 tablet, Rfl: 2

## 2025-04-01 NOTE — PROGRESS NOTES
Reason for Encounter New Injury    Subjective:       Chief Complaint: Navjot Cool is a 19 y.o. female student at [employer] ([employer]) who had concerns including Numbness of the Right Hand.    On 3/30/2025 softball athlete notified Athletic Training that she was losing sensation on the ulnar side of her right hand.    Handedness: right-handed  Sport played: softball      Level:      Position:pitcher          Review of Systems   Neurological:  Positive for numbness.                 Objective:       General: Navjot is well-developed, well-nourished, appears stated age, in no acute distress, alert and oriented to time, place and person.                 Right Hand/Wrist Exam     Pain   Wrist - The patient exhibits pain of the ulnar nerve.    Tenderness   The patient is tender to palpation of the ulnar area.      Left Hand/Wrist Exam   Left hand exam is normal.                  Assessment:     Status: L - Limited    Date Seen:  3/30/2025    Date of Injury:  3/30/2025    Date Out:  3/30/2025    Date Cleared:  n/a        Treatment/Rehab/Maintenance:           Plan:       1. Limit practice and refer to team Doctor for further evaluation  2. Physician Referral: yes  3. ED Referral:no  4. Parent/Guardian Notified: Yes Date 3/30/2025  Method of Communication: In person  5. All questions were answered, ath. will contact me for questions or concerns in  the interim.  6.         Eligible to use School Insurance: Yes

## 2025-04-02 ENCOUNTER — OFFICE VISIT (OUTPATIENT)
Dept: SPORTS MEDICINE | Facility: CLINIC | Age: 20
End: 2025-04-02
Payer: COMMERCIAL

## 2025-04-02 ENCOUNTER — HOSPITAL ENCOUNTER (OUTPATIENT)
Dept: RADIOLOGY | Facility: HOSPITAL | Age: 20
Discharge: HOME | End: 2025-04-02
Attending: STUDENT IN AN ORGANIZED HEALTH CARE EDUCATION/TRAINING PROGRAM
Payer: COMMERCIAL

## 2025-04-02 VITALS
BODY MASS INDEX: 38.56 KG/M2 | HEIGHT: 63 IN | DIASTOLIC BLOOD PRESSURE: 78 MMHG | HEART RATE: 76 BPM | SYSTOLIC BLOOD PRESSURE: 119 MMHG

## 2025-04-02 DIAGNOSIS — M79.644 FINGER PAIN, RIGHT: ICD-10-CM

## 2025-04-02 DIAGNOSIS — G56.21 NEURITIS OF RIGHT ULNAR NERVE: Primary | ICD-10-CM
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
## 2025-04-02 PROCEDURE — 73130 X-RAY EXAM OF HAND: CPT | Mod: 26,RT,, | Performed by: RADIOLOGY

## 2025-04-02 PROCEDURE — 3078F DIAST BP <80 MM HG: CPT | Mod: CPTII,S$GLB,, | Performed by: STUDENT IN AN ORGANIZED HEALTH CARE EDUCATION/TRAINING PROGRAM

## 2025-04-02 PROCEDURE — 1159F MED LIST DOCD IN RCRD: CPT | Mod: CPTII,S$GLB,, | Performed by: STUDENT IN AN ORGANIZED HEALTH CARE EDUCATION/TRAINING PROGRAM

## 2025-04-02 PROCEDURE — 99999 PR PBB SHADOW E&M-EST. PATIENT-LVL III: CPT | Mod: PBBFAC,,, | Performed by: STUDENT IN AN ORGANIZED HEALTH CARE EDUCATION/TRAINING PROGRAM

## 2025-04-02 PROCEDURE — 3008F BODY MASS INDEX DOCD: CPT | Mod: CPTII,S$GLB,, | Performed by: STUDENT IN AN ORGANIZED HEALTH CARE EDUCATION/TRAINING PROGRAM

## 2025-04-02 PROCEDURE — 1160F RVW MEDS BY RX/DR IN RCRD: CPT | Mod: CPTII,S$GLB,, | Performed by: STUDENT IN AN ORGANIZED HEALTH CARE EDUCATION/TRAINING PROGRAM

## 2025-04-02 PROCEDURE — 73130 X-RAY EXAM OF HAND: CPT | Mod: TC,RT

## 2025-04-02 PROCEDURE — 99214 OFFICE O/P EST MOD 30 MIN: CPT | Mod: S$GLB,,, | Performed by: STUDENT IN AN ORGANIZED HEALTH CARE EDUCATION/TRAINING PROGRAM

## 2025-04-02 PROCEDURE — 3074F SYST BP LT 130 MM HG: CPT | Mod: CPTII,S$GLB,, | Performed by: STUDENT IN AN ORGANIZED HEALTH CARE EDUCATION/TRAINING PROGRAM

## 2025-05-05 ENCOUNTER — PATIENT MESSAGE (OUTPATIENT)
Dept: PRIMARY CARE CLINIC | Facility: CLINIC | Age: 20
End: 2025-05-05
Payer: COMMERCIAL

## 2025-05-28 ENCOUNTER — OFFICE VISIT (OUTPATIENT)
Dept: URGENT CARE | Facility: CLINIC | Age: 20
End: 2025-05-28
Payer: COMMERCIAL

## 2025-05-28 VITALS
TEMPERATURE: 98 F | SYSTOLIC BLOOD PRESSURE: 115 MMHG | OXYGEN SATURATION: 99 % | DIASTOLIC BLOOD PRESSURE: 73 MMHG | RESPIRATION RATE: 19 BRPM | HEART RATE: 87 BPM

## 2025-05-28 DIAGNOSIS — H60.502 ACUTE OTITIS EXTERNA OF LEFT EAR, UNSPECIFIED TYPE: ICD-10-CM

## 2025-05-28 DIAGNOSIS — H61.23 BILATERAL IMPACTED CERUMEN: Primary | ICD-10-CM

## 2025-05-28 PROCEDURE — 99213 OFFICE O/P EST LOW 20 MIN: CPT | Mod: 25,S$GLB,,

## 2025-05-28 PROCEDURE — 69209 REMOVE IMPACTED EAR WAX UNI: CPT | Mod: 50,S$GLB,,

## 2025-05-28 RX ORDER — CIPROFLOXACIN AND DEXAMETHASONE 3; 1 MG/ML; MG/ML
4 SUSPENSION/ DROPS AURICULAR (OTIC) 2 TIMES DAILY
Qty: 7.5 ML | Refills: 0 | Status: SHIPPED | OUTPATIENT
Start: 2025-05-28 | End: 2025-06-04

## 2025-05-28 NOTE — PROCEDURES
Ear Cerumen Removal    Date/Time: 5/28/2025 3:00 PM    Performed by: Miroslava Mahan PA-C  Authorized by: Miroslava Mahan PA-C    Location details:  Both ears  Procedure type: irrigation    Cerumen  Removal Results:  Cerumen completely removed  Patient tolerance:  Patient tolerated the procedure well with no immediate complications

## 2025-05-28 NOTE — PROGRESS NOTES
Subjective:      Patient ID: Navjot Cool is a 19 y.o. female.    Vitals:  oral temperature is 98.3 °F (36.8 °C). Her blood pressure is 115/73 and her pulse is 87. Her respiration is 19 and oxygen saturation is 99%.     Chief Complaint: Otalgia    This is a 19 y.o. female who presents today with a chief complaint of px in L ear x 1 day. [place] and had water in her ear. Pt relieved the pressure and water but then today pt got a headache and her L ear started hurting very badly. Pt says she can barely hear out of L ear. She says it feels like it is starting to drain but has not noticed any discharge. Ear does not show redness or irritation.    Home tx: Airborne allergy medicine, pain reliever    [facility]: none    Otalgia   There is pain in the left ear. This is a new problem. The current episode started today. The problem occurs constantly. The problem has been unchanged. There has been no fever. The pain is at a severity of 7/10. Associated symptoms include headaches and hearing loss. Pertinent negatives include no abdominal pain, coughing, diarrhea, neck pain, rash, rhinorrhea, sore throat or vomiting. Her past medical history is significant for a tympanostomy tube. There is no history of a chronic ear infection or hearing loss. tubes removed when younger       HENT:  Positive for ear pain and hearing loss. Negative for sore throat.    Neck: Negative for neck pain.   Respiratory:  Negative for cough.    Gastrointestinal:  Negative for abdominal pain, vomiting and diarrhea.   Skin:  Negative for rash.   Neurological:  Positive for headaches.      Objective:     Physical Exam   Constitutional: She is oriented to person, place, and time. She appears well-developed. She is cooperative.  Non-toxic appearance. She does not appear ill. No distress.      Comments:Patient sits comfortably in exam chair. Answers questions in complete sentences. Does not show any signs of distress or  discoloration.        HENT:   Head: Normocephalic and atraumatic.   Ears:   Right Ear: Hearing, tympanic membrane, external ear and ear canal normal. impacted cerumen  Left Ear: Hearing, tympanic membrane, external ear and ear canal normal. There is swelling and tenderness. impacted cerumen  Nose: Nose normal. No mucosal edema or nasal deformity. No epistaxis. Right sinus exhibits no maxillary sinus tenderness and no frontal sinus tenderness. Left sinus exhibits no maxillary sinus tenderness and no frontal sinus tenderness.   Mouth/Throat: Uvula is midline, oropharynx is clear and moist and mucous membranes are normal. No trismus in the jaw. Normal dentition. No uvula swelling. No posterior oropharyngeal edema.   Eyes: Conjunctivae and lids are normal. No scleral icterus.   Neck: Trachea normal and phonation normal. Neck supple. No edema present. No erythema present. No neck rigidity present.   Pulmonary/Chest: Effort normal. No respiratory distress. She has no decreased breath sounds. She has no rhonchi.   Abdominal: Normal appearance.   Musculoskeletal: Normal range of motion.         General: No deformity. Normal range of motion.   Neurological: She is alert and oriented to person, place, and time. She exhibits normal muscle tone. Coordination normal.   Skin: Skin is warm, dry, intact, not diaphoretic and not pale.   Psychiatric: Her speech is normal and behavior is normal. Judgment and thought content normal.   Nursing note and vitals reviewed.      Assessment:     1. Bilateral impacted cerumen    2. Acute otitis externa of left ear, unspecified type        Plan:       Bilateral impacted cerumen  -     Ear wax removal  -     Ear Cerumen Removal    Acute otitis externa of left ear, unspecified type  -     ciprofloxacin-dexAMETHasone 0.3-0.1% (CIPRODEX) 0.3-0.1 % DrpS; Place 4 drops into both ears 2 (two) times daily. for 7 days  Dispense: 7.5 mL; Refill: 0                  Patient Instructions   Cerumen  Impaction:  - Mix equal parts of hydrogen peroxide and warm water together. Place in affected ear and let sit for 5 minutes. Then let the affected ear drain. This should help soften the wax, so that it easily comes out. Steam from hot showers also help to loosen up ear wax as well. Do this about 3 times a week.     - Rest.    - Drink plenty of fluids.    - Acetaminophen (tylenol) or Ibuprofen (advil,motrin) as directed as needed for fever/pain. Avoid tylenol if you have a history of liver disease. Do not take ibuprofen if you have a history of GI bleeding, kidney disease, or if you take blood thinners.   - Ibuprofen dosing for adults: 400 mg by mouth every 4-6 hours as needed. Max: 2400 mg/day; Info: use lowest effective dose, shortest effective treatment duration; give w/ food if GI upset occurs.  - Tylenol dosing for adults: [By mouth route, immediate-release form] Dose: 325-1000 mg by mouth every 4-6h as needed; Max: 1 g/4h and 4 g/day from all sources. [By mouth route, extended-release form] Dose: 650-1300 mg Extended Release by mouth every 8h as needed; Max: 4 g/day from all sources.     - You must understand that you have received an Urgent Care treatment only and that you may be released before all of your medical problems are known or treated.   - You, the patient, will arrange for follow up care as instructed.   - If your condition worsens or fails to improve we recommend that you receive another evaluation at the ER immediately or contact your PCP to discuss your concerns or return here.   - Follow up with your PCP or specialty clinic as directed in the next 1-2 weeks if not improved or as needed.  You can call [phone] to schedule an appointment with the appropriate provider.    If your symptoms do not improve or worsen, go to the emergency room immediately.

## 2025-05-28 NOTE — PATIENT INSTRUCTIONS
Cerumen Impaction:  - Mix equal parts of hydrogen peroxide and warm water together. Place in affected ear and let sit for 5 minutes. Then let the affected ear drain. This should help soften the wax, so that it easily comes out. Steam from hot showers also help to loosen up ear wax as well. Do this about 3 times a week.     - Rest.    - Drink plenty of fluids.    - Acetaminophen (tylenol) or Ibuprofen (advil,motrin) as directed as needed for fever/pain. Avoid tylenol if you have a history of liver disease. Do not take ibuprofen if you have a history of GI bleeding, kidney disease, or if you take blood thinners.   - Ibuprofen dosing for adults: 400 mg by mouth every 4-6 hours as needed. Max: 2400 mg/day; Info: use lowest effective dose, shortest effective treatment duration; give w/ food if GI upset occurs.  - Tylenol dosing for adults: [By mouth route, immediate-release form] Dose: 325-1000 mg by mouth every 4-6h as needed; Max: 1 g/4h and 4 g/day from all sources. [By mouth route, extended-release form] Dose: 650-1300 mg Extended Release by mouth every 8h as needed; Max: 4 g/day from all sources.     - You must understand that you have received an Urgent Care treatment only and that you may be released before all of your medical problems are known or treated.   - You, the patient, will arrange for follow up care as instructed.   - If your condition worsens or fails to improve we recommend that you receive another evaluation at the ER immediately or contact your PCP to discuss your concerns or return here.   - Follow up with your PCP or specialty clinic as directed in the next 1-2 weeks if not improved or as needed.  You can call [phone] to schedule an appointment with the appropriate provider.    If your symptoms do not improve or worsen, go to the emergency room immediately.

## 2025-06-03 ENCOUNTER — OFFICE VISIT (OUTPATIENT)
Dept: OTOLARYNGOLOGY | Facility: CLINIC | Age: 20
End: 2025-06-03
Payer: COMMERCIAL

## 2025-06-03 VITALS — WEIGHT: 211.88 LBS | BODY MASS INDEX: 37.53 KG/M2

## 2025-06-03 DIAGNOSIS — H93.8X3 SENSATION OF FULLNESS IN BOTH EARS: ICD-10-CM

## 2025-06-03 DIAGNOSIS — H73.893 RETRACTION OF TYMPANIC MEMBRANE OF BOTH EARS: ICD-10-CM

## 2025-06-03 DIAGNOSIS — H69.93 DYSFUNCTION OF BOTH EUSTACHIAN TUBES: Primary | ICD-10-CM

## 2025-06-03 PROCEDURE — 3008F BODY MASS INDEX DOCD: CPT | Mod: CPTII,S$GLB,, | Performed by: PHYSICIAN ASSISTANT

## 2025-06-03 PROCEDURE — 99999 PR PBB SHADOW E&M-EST. PATIENT-LVL II: CPT | Mod: PBBFAC,,, | Performed by: PHYSICIAN ASSISTANT

## 2025-06-03 PROCEDURE — 99204 OFFICE O/P NEW MOD 45 MIN: CPT | Mod: S$GLB,,, | Performed by: PHYSICIAN ASSISTANT

## 2025-06-03 RX ORDER — CETIRIZINE HYDROCHLORIDE 5 MG/1
5 TABLET ORAL DAILY
COMMUNITY

## 2025-06-05 ENCOUNTER — OFFICE VISIT (OUTPATIENT)
Dept: PRIMARY CARE CLINIC | Facility: CLINIC | Age: 20
End: 2025-06-05
Payer: COMMERCIAL

## 2025-06-05 ENCOUNTER — PATIENT MESSAGE (OUTPATIENT)
Dept: PRIMARY CARE CLINIC | Facility: CLINIC | Age: 20
End: 2025-06-05

## 2025-06-05 DIAGNOSIS — Z13.220 ENCOUNTER FOR LIPID SCREENING FOR CARDIOVASCULAR DISEASE: ICD-10-CM

## 2025-06-05 DIAGNOSIS — F41.1 GAD (GENERALIZED ANXIETY DISORDER): Primary | ICD-10-CM

## 2025-06-05 DIAGNOSIS — Z00.00 ANNUAL PHYSICAL EXAM: ICD-10-CM

## 2025-06-05 DIAGNOSIS — Z13.6 ENCOUNTER FOR LIPID SCREENING FOR CARDIOVASCULAR DISEASE: ICD-10-CM

## 2025-06-05 RX ORDER — SERTRALINE HYDROCHLORIDE 25 MG/1
25 TABLET, FILM COATED ORAL DAILY
Qty: 90 TABLET | Refills: 3 | Status: SHIPPED | OUTPATIENT
Start: 2025-06-05

## 2025-06-24 ENCOUNTER — OFFICE VISIT (OUTPATIENT)
Dept: OBSTETRICS AND GYNECOLOGY | Facility: CLINIC | Age: 20
End: 2025-06-24
Payer: COMMERCIAL

## 2025-06-24 VITALS
BODY MASS INDEX: 38.83 KG/M2 | DIASTOLIC BLOOD PRESSURE: 74 MMHG | WEIGHT: 219.13 LBS | HEIGHT: 63 IN | SYSTOLIC BLOOD PRESSURE: 116 MMHG

## 2025-06-24 DIAGNOSIS — F41.9 ANXIETY: ICD-10-CM

## 2025-06-24 DIAGNOSIS — N92.6 IRREGULAR MENSES: Primary | ICD-10-CM

## 2025-06-24 PROCEDURE — 1160F RVW MEDS BY RX/DR IN RCRD: CPT | Mod: CPTII,S$GLB,, | Performed by: OBSTETRICS & GYNECOLOGY

## 2025-06-24 PROCEDURE — 3078F DIAST BP <80 MM HG: CPT | Mod: CPTII,S$GLB,, | Performed by: OBSTETRICS & GYNECOLOGY

## 2025-06-24 PROCEDURE — 99999 PR PBB SHADOW E&M-EST. PATIENT-LVL IV: CPT | Mod: PBBFAC,,, | Performed by: OBSTETRICS & GYNECOLOGY

## 2025-06-24 PROCEDURE — 1159F MED LIST DOCD IN RCRD: CPT | Mod: CPTII,S$GLB,, | Performed by: OBSTETRICS & GYNECOLOGY

## 2025-06-24 PROCEDURE — 3074F SYST BP LT 130 MM HG: CPT | Mod: CPTII,S$GLB,, | Performed by: OBSTETRICS & GYNECOLOGY

## 2025-06-24 PROCEDURE — 3008F BODY MASS INDEX DOCD: CPT | Mod: CPTII,S$GLB,, | Performed by: OBSTETRICS & GYNECOLOGY

## 2025-06-24 PROCEDURE — 99204 OFFICE O/P NEW MOD 45 MIN: CPT | Mod: S$GLB,,, | Performed by: OBSTETRICS & GYNECOLOGY

## 2025-06-24 NOTE — PROGRESS NOTES
"CC: irregular cycles  Well woman exam    Navjot Cool is a 19 y.o. female  presents for a well woman exam.    She has irregular cycles.  They may come at different times of the month  She is not charting on an cesario.  NO change in weight   Acne clears up with face wash  No BP issues.NO elevated cholesterol    Past Medical History:   Diagnosis Date    Arm fracture     Asthma     exercise induced per mother    Eczema        Past Surgical History:   Procedure Laterality Date    ADENOIDECTOMY      ARTHROSCOPIC CHONDROPLASTY OF KNEE JOINT Left 2024    Procedure: ARTHROSCOPY, KNEE, WITH CHONDROPLASTY PARTIAL AND LATERAL;  Surgeon: Evan Metzger MD;  Location: [facility];  Service: Orthopedics;  Laterality: Left;  regional w/o catheter (adductor)    TONSILLECTOMY      TYMPANOSTOMY TUBE PLACEMENT         OB History    Para Term  AB Living   0 0 0 0 0 0   SAB IAB Ectopic Multiple Live Births   0 0 0 0 0       Family History   Problem Relation Name Age of Onset    Colon cancer Paternal Grandfather      Cancer Paternal Grandfather          colon    Cancer Paternal Grandmother          not sure type    Melanoma Neg Hx      Diabetes Neg Hx      Heart disease Neg Hx      Stroke Neg Hx      Breast cancer Neg Hx      Ovarian cancer Neg Hx      Uterine cancer Neg Hx      Cervical cancer Neg Hx         Social History[1]    /74   Ht 5' 3" (1.6 m)   Wt 99.4 kg (219 lb 2.2 oz)   LMP 06/10/2025 (Approximate)   BMI 38.82 kg/m²     ROS:  GENERAL: Denies weight gain or weight loss. Feeling well overall.   SKIN: Denies rash or lesions.   HEAD: Denies head injury or headache.   NODES: Denies enlarged lymph nodes.   CHEST: Denies chest pain or shortness of breath.   CARDIOVASCULAR: Denies palpitations or left sided chest pain.   ABDOMEN: No abdominal pain, constipation, diarrhea, nausea, vomiting or rectal bleeding.   URINARY: No frequency, dysuria, hematuria, or burning on " urination.  REPRODUCTIVE: See HPI.   BREASTS: The patient performs breast self-examination and denies pain, lumps, or nipple discharge.   HEMATOLOGIC: No easy bruisability or excessive bleeding.  MUSCULOSKELETAL: Denies joint pain or swelling.   NEUROLOGIC: Denies syncope or weakness.   PSYCHIATRIC: Denies depression, anxiety or mood swings.    Physical Exam:    APPEARANCE: Well nourished, well developed, in no acute distress.  AFFECT: WNL, alert and oriented x 3  SKIN: No acne or hirsutism  NECK: Neck symmetric without masses or thyromegaly  NODES: No inguinal, cervical, axillary, or femoral lymph node enlargement  CHEST: Good respiratory effect  ABDOMEN: Soft.  No tenderness or masses.  No hepatosplenomegaly.  No hernias.  BREASTS: Symmetrical, no skin changes or visible lesions.  No palpable masses, nipple discharge bilaterally.  PELVIC: Normal external genitalia without lesions.  Normal hair distribution.  Adequate perineal body, normal urethral meatus.  Vagina moist and well rugated without lesions or discharge.  Cervix pink, without lesions, discharge or tenderness.  No significant cystocele or rectocele.  Bimanual exam shows uterus to be normal size, regular, mobile and nontender.  Adnexa without masses or tenderness.    EXTREMITIES: No edema.    ASSESSMENT AND PLAN  1. Irregular menses  Ambulatory referral/consult to Obstetrics / Gynecology    17-Hydroxyprogesterone    DHEA-Sulfate    Estradiol    Follicle Stimulating Hormone    Glucose, Random    Insulin, Random    Luteinizing Hormone    Testosterone Panel    TSH    Hemoglobin A1C    Prolactin      2. Anxiety          Natural Cycles PATRICK  Charting cycles,  BBT,  Cervical mucus  Fertility monitors, Clear blue, Proov ( progesterone levels)  Kegg /Cervical mucus monitors        Patient was counseled today on A.C.S. Pap guidelines and recommendations for yearly pelvic exams, mammograms and monthly self breast exams; to see her PCP for other health maintenance.        Follow up in about 1 year (around 6/24/2026), or if symptoms worsen or fail to improve.           [1]   Social History  Tobacco Use    Smoking status: Never     Passive exposure: Yes    Smokeless tobacco: Never   Substance Use Topics    Alcohol use: Yes     Comment: occasionally    Drug use: Never

## 2025-07-03 ENCOUNTER — PATIENT MESSAGE (OUTPATIENT)
Dept: SPORTS MEDICINE | Facility: CLINIC | Age: 20
End: 2025-07-03
Payer: COMMERCIAL

## 2025-07-22 ENCOUNTER — TELEPHONE (OUTPATIENT)
Dept: OBSTETRICS AND GYNECOLOGY | Facility: CLINIC | Age: 20
End: 2025-07-22
Payer: COMMERCIAL

## 2025-08-13 ENCOUNTER — LAB VISIT (OUTPATIENT)
Dept: LAB | Facility: HOSPITAL | Age: 20
End: 2025-08-13
Attending: OBSTETRICS & GYNECOLOGY
Payer: COMMERCIAL

## 2025-08-13 DIAGNOSIS — Z13.6 ENCOUNTER FOR LIPID SCREENING FOR CARDIOVASCULAR DISEASE: ICD-10-CM

## 2025-08-13 DIAGNOSIS — Z00.00 ANNUAL PHYSICAL EXAM: ICD-10-CM

## 2025-08-13 DIAGNOSIS — Z13.220 ENCOUNTER FOR LIPID SCREENING FOR CARDIOVASCULAR DISEASE: ICD-10-CM

## 2025-08-13 DIAGNOSIS — N92.6 IRREGULAR MENSES: ICD-10-CM

## 2025-08-13 LAB
ABSOLUTE EOSINOPHIL (OHS): 0.26 K/UL
ABSOLUTE MONOCYTE (OHS): 0.48 K/UL (ref 0.3–1)
ABSOLUTE NEUTROPHIL COUNT (OHS): 4.54 K/UL (ref 1.8–7.7)
ALBUMIN SERPL BCP-MCNC: 4 G/DL (ref 3.5–5.2)
ALP SERPL-CCNC: 67 UNIT/L (ref 40–150)
ALT SERPL W/O P-5'-P-CCNC: 25 UNIT/L (ref 0–55)
ANION GAP (OHS): 13 MMOL/L (ref 8–16)
AST SERPL-CCNC: 28 UNIT/L (ref 0–50)
BASOPHILS # BLD AUTO: 0.04 K/UL
BASOPHILS NFR BLD AUTO: 0.5 %
BILIRUB SERPL-MCNC: 0.2 MG/DL (ref 0.1–1)
BUN SERPL-MCNC: 13 MG/DL (ref 6–20)
CALCIUM SERPL-MCNC: 8.8 MG/DL (ref 8.7–10.5)
CHLORIDE SERPL-SCNC: 106 MMOL/L (ref 95–110)
CHOLEST SERPL-MCNC: 212 MG/DL (ref 120–199)
CHOLEST/HDLC SERPL: 5.2 {RATIO} (ref 2–5)
CO2 SERPL-SCNC: 22 MMOL/L (ref 23–29)
CREAT SERPL-MCNC: 0.9 MG/DL (ref 0.5–1.4)
DHEA-S SERPL-MCNC: 317.5 UG/DL (ref 61.2–493.6)
EAG (OHS): 103 MG/DL (ref 68–131)
ERYTHROCYTE [DISTWIDTH] IN BLOOD BY AUTOMATED COUNT: 12.5 % (ref 11.5–14.5)
ESTRADIOL SERPL HS-MCNC: <10 PG/ML
FSH SERPL-ACNC: 4.49 MIU/ML
GFR SERPLBLD CREATININE-BSD FMLA CKD-EPI: >60 ML/MIN/1.73/M2
GLUCOSE SERPL-MCNC: 82 MG/DL (ref 70–110)
HBA1C MFR BLD: 5.2 % (ref 4–5.6)
HCT VFR BLD AUTO: 39.3 % (ref 37–48.5)
HDLC SERPL-MCNC: 41 MG/DL (ref 40–75)
HDLC SERPL: 19.3 % (ref 20–50)
HGB BLD-MCNC: 12.6 GM/DL (ref 12–16)
IMM GRANULOCYTES # BLD AUTO: 0.02 K/UL (ref 0–0.04)
IMM GRANULOCYTES NFR BLD AUTO: 0.2 % (ref 0–0.5)
INSULIN SERPL-ACNC: 13.5 UU/ML
LDLC SERPL CALC-MCNC: 145.2 MG/DL (ref 63–159)
LH SERPL-ACNC: 2.2 MIU/ML
LYMPHOCYTES # BLD AUTO: 3 K/UL (ref 1–4.8)
MCH RBC QN AUTO: 28.1 PG (ref 27–31)
MCHC RBC AUTO-ENTMCNC: 32.1 G/DL (ref 32–36)
MCV RBC AUTO: 88 FL (ref 82–98)
NONHDLC SERPL-MCNC: 171 MG/DL
NUCLEATED RBC (/100WBC) (OHS): 0 /100 WBC
PLATELET # BLD AUTO: 266 K/UL (ref 150–450)
PMV BLD AUTO: 11.4 FL (ref 9.2–12.9)
POTASSIUM SERPL-SCNC: 4.1 MMOL/L (ref 3.5–5.1)
PROLACTIN SERPL IA-MCNC: 33.5 NG/ML (ref 5.2–26.5)
PROT SERPL-MCNC: 7.3 GM/DL (ref 6–8.4)
RBC # BLD AUTO: 4.49 M/UL (ref 4–5.4)
RELATIVE EOSINOPHIL (OHS): 3.1 %
RELATIVE LYMPHOCYTE (OHS): 36 % (ref 18–48)
RELATIVE MONOCYTE (OHS): 5.8 % (ref 4–15)
RELATIVE NEUTROPHIL (OHS): 54.4 % (ref 38–73)
SODIUM SERPL-SCNC: 141 MMOL/L (ref 136–145)
TRIGL SERPL-MCNC: 129 MG/DL (ref 30–150)
TSH SERPL-ACNC: 2.56 UIU/ML (ref 0.4–4)
WBC # BLD AUTO: 8.34 K/UL (ref 3.9–12.7)

## 2025-08-13 PROCEDURE — 85025 COMPLETE CBC W/AUTO DIFF WBC: CPT

## 2025-08-13 PROCEDURE — 83498 ASY HYDROXYPROGESTERONE 17-D: CPT

## 2025-08-13 PROCEDURE — 83002 ASSAY OF GONADOTROPIN (LH): CPT

## 2025-08-13 PROCEDURE — 82627 DEHYDROEPIANDROSTERONE: CPT

## 2025-08-13 PROCEDURE — 80061 LIPID PANEL: CPT

## 2025-08-13 PROCEDURE — 84443 ASSAY THYROID STIM HORMONE: CPT

## 2025-08-13 PROCEDURE — 82040 ASSAY OF SERUM ALBUMIN: CPT

## 2025-08-13 PROCEDURE — 82670 ASSAY OF TOTAL ESTRADIOL: CPT

## 2025-08-13 PROCEDURE — 83001 ASSAY OF GONADOTROPIN (FSH): CPT

## 2025-08-13 PROCEDURE — 83525 ASSAY OF INSULIN: CPT

## 2025-08-13 PROCEDURE — 82565 ASSAY OF CREATININE: CPT

## 2025-08-13 PROCEDURE — 83036 HEMOGLOBIN GLYCOSYLATED A1C: CPT

## 2025-08-13 PROCEDURE — 36415 COLL VENOUS BLD VENIPUNCTURE: CPT | Mod: PN

## 2025-08-13 PROCEDURE — 84146 ASSAY OF PROLACTIN: CPT

## 2025-08-18 LAB — W 17-ALPHA HYDROXYPROGESTERONE: 193 NG/DL

## (undated) DEVICE — PAD ELECTRODE STER 1.5X3

## (undated) DEVICE — GOWN ECLIPSE REINF LV4 XLNG XL

## (undated) DEVICE — ADHESIVE MASTISOL VIAL 48/BX

## (undated) DEVICE — SOL NACL IRR 3000ML

## (undated) DEVICE — SOL NACL IRR 1000ML BTL

## (undated) DEVICE — SUT MONOCRYL 4-0 PS-2

## (undated) DEVICE — TUBE SET INFLOW/OUTFLOW

## (undated) DEVICE — COVER CAMERA OPERATING ROOM

## (undated) DEVICE — STRIP MEDI WND CLSR 1/2X4IN

## (undated) DEVICE — COVER LIGHT HANDLE 80/CA

## (undated) DEVICE — GLOVE SENSICARE PI SURG 8

## (undated) DEVICE — GLOVE SENSICARE PI GRN 8

## (undated) DEVICE — DRAPE TOP 53X102IN

## (undated) DEVICE — ELECTRODE 90 DEGREE ANGLE

## (undated) DEVICE — BLADE SHAVER LANZA 4.2X13CM

## (undated) DEVICE — PAD ABDOMINAL STERILE 8X10IN

## (undated) DEVICE — Device

## (undated) DEVICE — UNDERGLOVES BIOGEL PI SIZE 7.5

## (undated) DEVICE — SPONGE COTTON TRAY 4X4IN

## (undated) DEVICE — DRAPE ARTHSCP FLD CTRL POUCH

## (undated) DEVICE — DRAPE STERI U-SHAPED 47X51IN

## (undated) DEVICE — TOURNIQUET SB QC DP 34X4IN

## (undated) DEVICE — DRESSING XEROFORM NONADH 1X8IN

## (undated) DEVICE — WRAP KNEE ACCU THERM GEL PACK